# Patient Record
Sex: FEMALE | Race: WHITE | NOT HISPANIC OR LATINO
[De-identification: names, ages, dates, MRNs, and addresses within clinical notes are randomized per-mention and may not be internally consistent; named-entity substitution may affect disease eponyms.]

---

## 2017-02-02 ENCOUNTER — APPOINTMENT (OUTPATIENT)
Dept: ORTHOPEDIC SURGERY | Facility: CLINIC | Age: 77
End: 2017-02-02

## 2017-02-02 VITALS
DIASTOLIC BLOOD PRESSURE: 70 MMHG | WEIGHT: 138 LBS | BODY MASS INDEX: 28.2 KG/M2 | HEIGHT: 58.66 IN | SYSTOLIC BLOOD PRESSURE: 124 MMHG

## 2017-11-21 ENCOUNTER — APPOINTMENT (OUTPATIENT)
Dept: ORTHOPEDIC SURGERY | Facility: CLINIC | Age: 77
End: 2017-11-21
Payer: MEDICARE

## 2017-11-21 PROCEDURE — 99215 OFFICE O/P EST HI 40 MIN: CPT

## 2018-08-16 ENCOUNTER — APPOINTMENT (OUTPATIENT)
Dept: ORTHOPEDIC SURGERY | Facility: CLINIC | Age: 78
End: 2018-08-16
Payer: MEDICARE

## 2018-08-16 VITALS
DIASTOLIC BLOOD PRESSURE: 60 MMHG | OXYGEN SATURATION: 90 % | WEIGHT: 141 LBS | BODY MASS INDEX: 29.2 KG/M2 | HEIGHT: 58.23 IN | HEART RATE: 92 BPM | SYSTOLIC BLOOD PRESSURE: 75 MMHG

## 2018-08-16 PROCEDURE — 99214 OFFICE O/P EST MOD 30 MIN: CPT

## 2019-03-20 ENCOUNTER — APPOINTMENT (OUTPATIENT)
Dept: INTERNAL MEDICINE | Facility: CLINIC | Age: 79
End: 2019-03-20
Payer: MEDICARE

## 2019-03-20 VITALS
WEIGHT: 140 LBS | SYSTOLIC BLOOD PRESSURE: 114 MMHG | TEMPERATURE: 98.7 F | DIASTOLIC BLOOD PRESSURE: 73 MMHG | BODY MASS INDEX: 29.39 KG/M2 | HEIGHT: 58 IN | HEART RATE: 92 BPM | OXYGEN SATURATION: 95 %

## 2019-03-20 PROCEDURE — G0439: CPT

## 2019-03-20 PROCEDURE — 36415 COLL VENOUS BLD VENIPUNCTURE: CPT

## 2019-03-20 NOTE — HISTORY OF PRESENT ILLNESS
[de-identified] : As above doing well;  Recent upper endoscopy: Hiatal Hernia; Gastritis; Esophageal ulcer;  [FreeTextEntry1] : Follow up on multiple medical problems;  Hypertension;  HLD; Osteoporosis;

## 2019-03-20 NOTE — ASSESSMENT
[FreeTextEntry1] : Stable examination; Looks much younger than his stated age;  Will get labs;  All labs refilled

## 2019-03-20 NOTE — PHYSICAL EXAM
[No Acute Distress] : no acute distress [Well Nourished] : well nourished [Well Developed] : well developed [Normal Sclera/Conjunctiva] : normal sclera/conjunctiva [Well-Appearing] : well-appearing [PERRL] : pupils equal round and reactive to light [EOMI] : extraocular movements intact [Normal Outer Ear/Nose] : the outer ears and nose were normal in appearance [Normal Oropharynx] : the oropharynx was normal [Supple] : supple [No JVD] : no jugular venous distention [Thyroid Normal, No Nodules] : the thyroid was normal and there were no nodules present [No Lymphadenopathy] : no lymphadenopathy [No Respiratory Distress] : no respiratory distress  [Clear to Auscultation] : lungs were clear to auscultation bilaterally [No Accessory Muscle Use] : no accessory muscle use [Normal Rate] : normal rate  [Regular Rhythm] : with a regular rhythm [Normal S1, S2] : normal S1 and S2 [No Murmur] : no murmur heard [No Carotid Bruits] : no carotid bruits [No Abdominal Bruit] : a ~M bruit was not heard ~T in the abdomen [No Varicosities] : no varicosities [Pedal Pulses Present] : the pedal pulses are present [No Extremity Clubbing/Cyanosis] : no extremity clubbing/cyanosis [No Edema] : there was no peripheral edema [Soft] : abdomen soft [No Palpable Aorta] : no palpable aorta [Non Tender] : non-tender [Non-distended] : non-distended [No Masses] : no abdominal mass palpated [Normal Bowel Sounds] : normal bowel sounds [No HSM] : no HSM [Normal Posterior Cervical Nodes] : no posterior cervical lymphadenopathy [Normal Anterior Cervical Nodes] : no anterior cervical lymphadenopathy [No CVA Tenderness] : no CVA  tenderness [No Spinal Tenderness] : no spinal tenderness [No Joint Swelling] : no joint swelling [Grossly Normal Strength/Tone] : grossly normal strength/tone [Normal Gait] : normal gait [No Rash] : no rash [Coordination Grossly Intact] : coordination grossly intact [No Focal Deficits] : no focal deficits [Normal Affect] : the affect was normal [Deep Tendon Reflexes (DTR)] : deep tendon reflexes were 2+ and symmetric [Normal Insight/Judgement] : insight and judgment were intact

## 2019-03-20 NOTE — HEALTH RISK ASSESSMENT
[No falls in past year] : Patient reported no falls in the past year [0] : 2) Feeling down, depressed, or hopeless: Not at all (0) [Patient reported mammogram was normal] : Patient reported mammogram was normal [Patient reported PAP Smear was normal] : Patient reported PAP Smear was normal [Patient reported bone density results were abnormal] : Patient reported bone density results were abnormal [Patient reported colonoscopy was normal] : Patient reported colonoscopy was normal [HIV test declined] : HIV test declined [Hepatitis C test declined] : Hepatitis C test declined [] : No [MammogramDate] : 2018 [PapSmearDate] : 2018 [BoneDensityComments] : Followed' by endocrine;  [BoneDensityDate] : 2018 [ColonoscopyDate] : 2015

## 2019-04-01 LAB
17OHP SERPL-MCNC: 13 NG/DL
25(OH)D3 SERPL-MCNC: 47.5 NG/ML
ALBUMIN SERPL ELPH-MCNC: 4.1 G/DL
ALP BLD-CCNC: 65 U/L
ALT SERPL-CCNC: 13 U/L
ANION GAP SERPL CALC-SCNC: 13 MMOL/L
APPEARANCE: CLEAR
AST SERPL-CCNC: 16 U/L
BACTERIA: NEGATIVE
BASOPHILS # BLD AUTO: 0.07 K/UL
BASOPHILS NFR BLD AUTO: 0.7 %
BILIRUB SERPL-MCNC: 0.5 MG/DL
BILIRUBIN URINE: NEGATIVE
BLOOD URINE: NEGATIVE
BUN SERPL-MCNC: 23 MG/DL
CALCIUM SERPL-MCNC: 9.4 MG/DL
CHLORIDE SERPL-SCNC: 102 MMOL/L
CHOLEST SERPL-MCNC: 186 MG/DL
CHOLEST/HDLC SERPL: 2.8 RATIO
CO2 SERPL-SCNC: 24 MMOL/L
COLOR: YELLOW
CREAT SERPL-MCNC: 0.83 MG/DL
EOSINOPHIL # BLD AUTO: 0.31 K/UL
EOSINOPHIL NFR BLD AUTO: 3.2 %
GLUCOSE QUALITATIVE U: NEGATIVE
GLUCOSE SERPL-MCNC: 111 MG/DL
HBA1C MFR BLD HPLC: 6.4 %
HCT VFR BLD CALC: 38.3 %
HDLC SERPL-MCNC: 66 MG/DL
HGB BLD-MCNC: 12.1 G/DL
HYALINE CASTS: 1 /LPF
IMM GRANULOCYTES NFR BLD AUTO: 0.2 %
KETONES URINE: NEGATIVE
LDLC SERPL CALC-MCNC: 99 MG/DL
LEUKOCYTE ESTERASE URINE: NEGATIVE
LYMPHOCYTES # BLD AUTO: 1.64 K/UL
LYMPHOCYTES NFR BLD AUTO: 16.7 %
MAN DIFF?: NORMAL
MCHC RBC-ENTMCNC: 30.7 PG
MCHC RBC-ENTMCNC: 31.6 GM/DL
MCV RBC AUTO: 97.2 FL
MICROSCOPIC-UA: NORMAL
MONOCYTES # BLD AUTO: 0.82 K/UL
MONOCYTES NFR BLD AUTO: 8.3 %
NEUTROPHILS # BLD AUTO: 6.98 K/UL
NEUTROPHILS NFR BLD AUTO: 70.9 %
NITRITE URINE: NEGATIVE
PH URINE: 6.5
PLATELET # BLD AUTO: 265 K/UL
POTASSIUM SERPL-SCNC: 4.5 MMOL/L
PROT SERPL-MCNC: 6.6 G/DL
PROTEIN URINE: NORMAL
RBC # BLD: 3.94 M/UL
RBC # FLD: 13.2 %
RED BLOOD CELLS URINE: 4 /HPF
SODIUM SERPL-SCNC: 139 MMOL/L
SPECIFIC GRAVITY URINE: 1.02
SQUAMOUS EPITHELIAL CELLS: 1 /HPF
T4 FREE SERPL-MCNC: 1.6 NG/DL
TRIGL SERPL-MCNC: 106 MG/DL
TSH SERPL-ACNC: 4.08 UIU/ML
UROBILINOGEN URINE: NORMAL
WBC # FLD AUTO: 9.84 K/UL
WHITE BLOOD CELLS URINE: 1 /HPF

## 2019-06-26 ENCOUNTER — APPOINTMENT (OUTPATIENT)
Dept: INTERNAL MEDICINE | Facility: CLINIC | Age: 79
End: 2019-06-26

## 2019-07-16 ENCOUNTER — RX RENEWAL (OUTPATIENT)
Age: 79
End: 2019-07-16

## 2019-08-07 ENCOUNTER — APPOINTMENT (OUTPATIENT)
Dept: ORTHOPEDIC SURGERY | Facility: CLINIC | Age: 79
End: 2019-08-07
Payer: MEDICARE

## 2019-08-07 VITALS
HEIGHT: 58.27 IN | BODY MASS INDEX: 28.58 KG/M2 | DIASTOLIC BLOOD PRESSURE: 60 MMHG | OXYGEN SATURATION: 98 % | SYSTOLIC BLOOD PRESSURE: 100 MMHG | HEART RATE: 92 BPM | WEIGHT: 138 LBS

## 2019-08-07 PROCEDURE — 99214 OFFICE O/P EST MOD 30 MIN: CPT

## 2019-08-07 NOTE — CONSULT LETTER
[Dear  ___] : Dear  [unfilled], [Courtesy Letter:] : I had the pleasure of seeing your patient, [unfilled], in my office today. [Please see my note below.] : Please see my note below. [Sincerely,] : Sincerely, [FreeTextEntry2] : Dr. John Rice\par [address]\[place] [address]

## 2019-08-07 NOTE — HISTORY OF PRESENT ILLNESS
[FreeTextEntry1] : This is a 1 year f/u visit for this 78 year old woman who was seen by me in 2017, prior 2017 and 4.5 years prior to that.\par Key medical history:\par 1. this patient, still a , underwent menopause at age 51, 25 years ago\par 2. pt took HRT for 2 years after menopause, completing this in \par 3. there is no personal hx of fracture: there is no hx of parental hip fx\par 4. max height is 5'1", current height 4'10.5", 2-2.5" height loss.\par \par OP Rx Hx:\par [id number]                HRT                    2 years\par [id number]                raloxifene          17 years\par -3/12                 IV ibandronate   1.5 years\par 3/12-3/14                  holiday                2 years\par 3/14-                  IV ibandronate   2.5 years\par -present            no medication       3 years\par \par 5. of note, pt has had GERD and this is why she never took oral bisphosphonates\par \par DXA done 18 was rev and compared with: DXA done 16 and  2012:\par T-scores: -0.7, -2.4, -2.0, -2.4 at the LS, L FN, L TH and R 1/3 radius; no significant change since prior studies\par T-scores: -0.6, -2.5, -1.9 and -2.5 at the LS (lowest T-score is -0.9 at L1), L FN, L TH and L 1/3 radius compared with:\par T-scores: -0.1, -2.3, -1.9 and -2.3 at these sites in \par \par Today, updated labs done 19 were rev and compared with: 18 ,  17 ( baseline markers drawn in 2017) and prior labs 16:\par glucose 117, A1c 6.3, crea: 0.88, BSAP 9.4 and  PTH/ca 45/9.4, 25 D: 48\par 25 D: 47, 50, 50\par TSH: not updated, 3.6\par PTH/ca: 30/9.6  alb: 4.0; 47/9.0  alb: 4.1\par calcium; 9.6, 9.0,  9.6  albumin : 4.5\par BSAP: 7.0, 7.0, 6.0\par CTX: 265, 197, 151\par glucose: 114, 105\par A1c: 6.2,  6.1, 6.4\par creat: 0.95, 0.92, 1.04\par hb/hct: 12.6/36.8\par \par chol: 214, 206, 210, 193\par HDL: 62, 76, 68,  79\par LDL: 125, 105, 119,  94\par T, 115, 102\par chol/HDL: 2.7\par \par PMH\par GERD\par diverticulitis\par \par FH\par mother, alive 103, has had breast cancer\par father,  79, DM\par \par SH\par , works in real estate, 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

## 2019-08-07 NOTE — ASSESSMENT
[FreeTextEntry1] : \par 78 year old woman with osteoporosis by DXA at femoral neck, 2.5 inches of height loss, no personal fractures or history of parental hip fractures, who has taken a cumulative 4 years of IV bisphosphonate (boniva), last dose July 2016. Bone density has been stable over 6 years of monitoring. With updated bone markers rising and 3 years since prior Rx we will resume anti resorptive now, using SQ Prolia because of history of GERD and requirement for daily Nexium for persistent symptoms.\par There continues to be IGT with A1c of 6.3.\par \par Plan:\par 1. SQ Prolia ordered, side effects reviewed\par 2. labs in 6 months, then f/u\par 3. with regard to lipids, referral made to internist for f/u\par \par  [Denosumab Therapy] : Risks  and benefits of denosumab therapy were discussed with the patient including eczema, cellulitis, osteonecrosis of the jaw and atypical femur fractures

## 2019-08-14 ENCOUNTER — OUTPATIENT (OUTPATIENT)
Dept: OUTPATIENT SERVICES | Facility: HOSPITAL | Age: 79
LOS: 1 days | End: 2019-08-14
Payer: MEDICARE

## 2019-08-14 ENCOUNTER — APPOINTMENT (OUTPATIENT)
Age: 79
End: 2019-08-14

## 2019-08-14 VITALS
DIASTOLIC BLOOD PRESSURE: 60 MMHG | RESPIRATION RATE: 18 BRPM | TEMPERATURE: 98 F | SYSTOLIC BLOOD PRESSURE: 105 MMHG | HEART RATE: 77 BPM | OXYGEN SATURATION: 97 %

## 2019-08-14 DIAGNOSIS — M81.0 AGE-RELATED OSTEOPOROSIS WITHOUT CURRENT PATHOLOGICAL FRACTURE: ICD-10-CM

## 2019-08-14 PROCEDURE — 96372 THER/PROPH/DIAG INJ SC/IM: CPT

## 2019-08-14 RX ORDER — DENOSUMAB 60 MG/ML
60 INJECTION SUBCUTANEOUS ONCE
Refills: 0 | Status: COMPLETED | OUTPATIENT
Start: 2019-08-14 | End: 2019-08-14

## 2019-08-14 RX ADMIN — DENOSUMAB 60 MILLIGRAM(S): 60 INJECTION SUBCUTANEOUS at 15:36

## 2019-09-18 ENCOUNTER — EMERGENCY (EMERGENCY)
Facility: HOSPITAL | Age: 79
LOS: 1 days | Discharge: HOME | End: 2019-09-18
Attending: EMERGENCY MEDICINE | Admitting: EMERGENCY MEDICINE
Payer: MEDICARE

## 2019-09-18 VITALS
RESPIRATION RATE: 18 BRPM | DIASTOLIC BLOOD PRESSURE: 82 MMHG | SYSTOLIC BLOOD PRESSURE: 109 MMHG | OXYGEN SATURATION: 99 % | HEART RATE: 83 BPM | TEMPERATURE: 98 F

## 2019-09-18 VITALS
HEART RATE: 84 BPM | OXYGEN SATURATION: 98 % | SYSTOLIC BLOOD PRESSURE: 108 MMHG | RESPIRATION RATE: 18 BRPM | TEMPERATURE: 98 F | HEIGHT: 59 IN | WEIGHT: 139.33 LBS | DIASTOLIC BLOOD PRESSURE: 74 MMHG
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
## 2019-09-18 DIAGNOSIS — S01.111A LACERATION WITHOUT FOREIGN BODY OF RIGHT EYELID AND PERIOCULAR AREA, INITIAL ENCOUNTER: ICD-10-CM
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
## 2019-09-18 DIAGNOSIS — S02.2XXA FRACTURE OF NASAL BONES, INITIAL ENCOUNTER FOR CLOSED FRACTURE: ICD-10-CM

## 2019-09-18 DIAGNOSIS — S01.21XA LACERATION WITHOUT FOREIGN BODY OF NOSE, INITIAL ENCOUNTER: ICD-10-CM
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
## 2019-09-18 PROCEDURE — 70486 CT MAXILLOFACIAL W/O DYE: CPT

## 2019-09-18 PROCEDURE — 70486 CT MAXILLOFACIAL W/O DYE: CPT | Mod: 26

## 2019-09-18 PROCEDURE — 70450 CT HEAD/BRAIN W/O DYE: CPT

## 2019-09-18 PROCEDURE — 99285 EMERGENCY DEPT VISIT HI MDM: CPT | Mod: 25

## 2019-09-18 PROCEDURE — 13151 CMPLX RPR E/N/E/L 1.1-2.5 CM: CPT

## 2019-09-18 PROCEDURE — 99284 EMERGENCY DEPT VISIT MOD MDM: CPT

## 2019-09-18 PROCEDURE — 12011 RPR F/E/E/N/L/M 2.5 CM/<: CPT | Mod: XU

## 2019-09-18 PROCEDURE — 21310: CPT

## 2019-09-18 PROCEDURE — 70450 CT HEAD/BRAIN W/O DYE: CPT | Mod: 26

## 2019-09-18 RX ORDER — ACETAMINOPHEN 500 MG
650 TABLET ORAL ONCE
Refills: 0 | Status: COMPLETED | OUTPATIENT
Start: 2019-09-18 | End: 2019-09-18

## 2019-09-18 RX ADMIN — Medication 650 MILLIGRAM(S): at 10:47

## 2019-09-18 NOTE — ED ADULT NURSE NOTE - OBJECTIVE STATEMENT
Patient presents to the ED after rolling off her bed while sleeping today.  Denies LOC.   Patient c/o pain to the right eye, nose.  AA&OX3, respirations unlabored, non-diaphoretic, no pallor.  Patient has 8cm laceration to right side of nose.  Minimal bleeding.  Ecchymosis to entire orbit.

## 2019-09-18 NOTE — ED PROVIDER NOTE - PATIENT PORTAL LINK FT
You can access the FollowMyHealth Patient Portal offered by [facility] by registering at the following website: [email or web address]. By joining [facility]’s FollowMyHealth portal, you will also be able to view your health information using other applications (apps) compatible with our system.

## 2019-09-18 NOTE — ED PROVIDER NOTE - SKIN, MLM
ecchymosis b/l under eyes. 2cm vertical laceration at right medial eyebrow, no active bleeding. ecchymosis b/l under eyes + nasal bridge w mild ttp. 2cm vertical laceration at right medial eyebrow, no active bleeding.

## 2019-09-18 NOTE — ED ADULT NURSE NOTE - NSIMPLEMENTINTERV_GEN_ALL_ED
Implemented All Fall Risk Interventions:  [place] to call system. Call bell, personal items and telephone within reach. Instruct patient to call for assistance. Room bathroom lighting operational. Non-slip footwear when patient is off stretcher. Physically safe environment: no spills, clutter or unnecessary equipment. Stretcher in lowest position, wheels locked, appropriate side rails in place. Provide visual cue, wrist band, yellow gown, etc. Monitor gait and stability. Monitor for mental status changes and reorient to person, place, and time. Review medications for side effects contributing to fall risk. Reinforce activity limits and safety measures with patient and family.

## 2019-09-18 NOTE — ED PROVIDER NOTE - OBJECTIVE STATEMENT
79y female with laceration over right eyebrow w surrounding ecchymosis s/p hitting head on nightstand this AM. No LOC. No other injuries. No ACs / anti-platelets.

## 2019-09-18 NOTE — ED PROVIDER NOTE - CARE PROVIDER_API CALL
Golden Klein)  Plastic Surgery  [address] [place] [id number]  Phone: [phone]  Fax: [phone]  Follow Up Time: 4-6 Days

## 2019-09-18 NOTE — ED PROVIDER NOTE - PROGRESS NOTE DETAILS
Repair by plastics complete. CT head + max/face reviewed + printed for pt. Will f/up plastics Dr Klein next week. Repair by plastics complete. CT head + max/face reviewed + printed for pt + d/w pt. Will f/up plastics Dr Klein next week.

## 2019-09-18 NOTE — ED PROVIDER NOTE - NSFOLLOWUPINSTRUCTIONS_ED_ALL_ED_FT
Follow up with Dr Avalos (plastic surgery) on Tuesday, September 24th. Remove the bandage in 2 days, wash and apply bacitracin, then re-bandage. Please bring the results of your CTs to the appointment as well, as there are 2 nasal bone fractures.    Tylenol or Motrin for pain as needed.

## 2019-09-18 NOTE — ED ADULT TRIAGE NOTE - CHIEF COMPLAINT QUOTE
post mechanical fall, reported falling out of bed this morning and hitting her head on a brass corner. Denies loc or on any anticoagulants. With laceration to brow and ecchymosis to periorbital, and hematoma to frontal.

## 2019-09-18 NOTE — ED PROVIDER NOTE - CLINICAL SUMMARY MEDICAL DECISION MAKING FREE TEXT BOX
79y female with facial laceration after low mechanism injury this AM. HDS, no neuro defects. Pt requests plastics for repair; paged out. Pending CT head + max/face. 79y female with facial laceration after low mechanism injury this AM. HDS, no neuro defects. Pt requests plastics for repair; paged out d/w Dr Klein. Pending CT head + max/face.

## 2019-09-18 NOTE — ED PROVIDER NOTE - CARE PLAN
Principal Discharge DX:	Facial laceration, initial encounter Principal Discharge DX:	Facial laceration, initial encounter  Secondary Diagnosis:	Nasal bone fractures

## 2019-10-15 PROBLEM — K21.9 GASTRO-ESOPHAGEAL REFLUX DISEASE WITHOUT ESOPHAGITIS: Chronic | Status: ACTIVE | Noted: 2019-09-18

## 2019-10-15 PROBLEM — E78.5 HYPERLIPIDEMIA, UNSPECIFIED: Chronic | Status: ACTIVE | Noted: 2019-09-18

## 2019-10-15 PROBLEM — I10 ESSENTIAL (PRIMARY) HYPERTENSION: Chronic | Status: ACTIVE | Noted: 2019-09-18

## 2019-11-14 ENCOUNTER — APPOINTMENT (OUTPATIENT)
Dept: INTERNAL MEDICINE | Facility: CLINIC | Age: 79
End: 2019-11-14
Payer: MEDICARE

## 2019-11-14 VITALS
WEIGHT: 136 LBS | DIASTOLIC BLOOD PRESSURE: 60 MMHG | SYSTOLIC BLOOD PRESSURE: 96 MMHG | TEMPERATURE: 97.8 F | HEART RATE: 89 BPM | BODY MASS INDEX: 28.16 KG/M2 | OXYGEN SATURATION: 97 % | HEIGHT: 58.27 IN

## 2019-11-14 PROCEDURE — 36415 COLL VENOUS BLD VENIPUNCTURE: CPT

## 2019-11-14 PROCEDURE — 99213 OFFICE O/P EST LOW 20 MIN: CPT | Mod: 25

## 2019-11-14 NOTE — HISTORY OF PRESENT ILLNESS
[FreeTextEntry1] : Concerned about  increased Lipids; Also increase Hemoglobin  A1C [de-identified] : As above medications without  change; Lipitor dose without change and marked difference in Lipid panel between both labs; Using Prolia \par for  bone loss\par Otherwise some minor problems;  \par Some exercise

## 2019-11-14 NOTE — PHYSICAL EXAM
[No Acute Distress] : no acute distress [Well Nourished] : well nourished [Well Developed] : well developed [Well-Appearing] : well-appearing [PERRL] : pupils equal round and reactive to light [Normal Sclera/Conjunctiva] : normal sclera/conjunctiva [EOMI] : extraocular movements intact [Normal Oropharynx] : the oropharynx was normal [Normal Outer Ear/Nose] : the outer ears and nose were normal in appearance [No JVD] : no jugular venous distention [No Lymphadenopathy] : no lymphadenopathy [Supple] : supple [Thyroid Normal, No Nodules] : the thyroid was normal and there were no nodules present [No Respiratory Distress] : no respiratory distress  [No Accessory Muscle Use] : no accessory muscle use [Normal Rate] : normal rate  [Clear to Auscultation] : lungs were clear to auscultation bilaterally [Normal S1, S2] : normal S1 and S2 [Regular Rhythm] : with a regular rhythm [No Carotid Bruits] : no carotid bruits [No Murmur] : no murmur heard [No Abdominal Bruit] : a ~M bruit was not heard ~T in the abdomen [No Varicosities] : no varicosities [Pedal Pulses Present] : the pedal pulses are present [No Edema] : there was no peripheral edema [No Palpable Aorta] : no palpable aorta [No Extremity Clubbing/Cyanosis] : no extremity clubbing/cyanosis [Soft] : abdomen soft [Non Tender] : non-tender [No Masses] : no abdominal mass palpated [Non-distended] : non-distended [Normal Bowel Sounds] : normal bowel sounds [No HSM] : no HSM [Normal Posterior Cervical Nodes] : no posterior cervical lymphadenopathy [Normal Anterior Cervical Nodes] : no anterior cervical lymphadenopathy [No CVA Tenderness] : no CVA  tenderness [No Spinal Tenderness] : no spinal tenderness [No Joint Swelling] : no joint swelling [Grossly Normal Strength/Tone] : grossly normal strength/tone [No Rash] : no rash [Coordination Grossly Intact] : coordination grossly intact [No Focal Deficits] : no focal deficits [Normal Gait] : normal gait [Deep Tendon Reflexes (DTR)] : deep tendon reflexes were 2+ and symmetric [Normal Affect] : the affect was normal [Normal Insight/Judgement] : insight and judgment were intact

## 2019-11-21 LAB
CHOLEST SERPL-MCNC: 187 MG/DL
CHOLEST/HDLC SERPL: 2.6 RATIO
ESTIMATED AVERAGE GLUCOSE: 131 MG/DL
HBA1C MFR BLD HPLC: 6.2 %
HDLC SERPL-MCNC: 71 MG/DL
LDLC SERPL CALC-MCNC: 91 MG/DL
TRIGL SERPL-MCNC: 127 MG/DL

## 2020-01-24 DIAGNOSIS — Z87.09 PERSONAL HISTORY OF OTHER DISEASES OF THE RESPIRATORY SYSTEM: ICD-10-CM

## 2020-01-28 ENCOUNTER — APPOINTMENT (OUTPATIENT)
Dept: RHEUMATOLOGY | Facility: CLINIC | Age: 80
End: 2020-01-28
Payer: MEDICARE

## 2020-01-28 VITALS
HEART RATE: 85 BPM | BODY MASS INDEX: 28.34 KG/M2 | WEIGHT: 135 LBS | OXYGEN SATURATION: 97 % | HEIGHT: 58 IN | SYSTOLIC BLOOD PRESSURE: 111 MMHG | TEMPERATURE: 98.7 F | DIASTOLIC BLOOD PRESSURE: 73 MMHG

## 2020-01-28 PROCEDURE — 99205 OFFICE O/P NEW HI 60 MIN: CPT | Mod: GC

## 2020-01-30 NOTE — PHYSICAL EXAM
[General Appearance - Alert] : alert [General Appearance - In No Acute Distress] : in no acute distress [Sclera] : the sclera and conjunctiva were normal [PERRL With Normal Accommodation] : pupils were equal in size, round, and reactive to light [Extraocular Movements] : extraocular movements were intact [Outer Ear] : the ears and nose were normal in appearance [Oropharynx] : the oropharynx was normal [Neck Appearance] : the appearance of the neck was normal [Neck Cervical Mass (___cm)] : no neck mass was observed [Jugular Venous Distention Increased] : there was no jugular-venous distention [Thyroid Diffuse Enlargement] : the thyroid was not enlarged [Thyroid Nodule] : there were no palpable thyroid nodules [Auscultation Breath Sounds / Voice Sounds] : lungs were clear to auscultation bilaterally [Heart Rate And Rhythm] : heart rate was normal and rhythm regular [Heart Sounds] : normal S1 and S2 [Heart Sounds Gallop] : no gallops [Murmurs] : no murmurs [Heart Sounds Pericardial Friction Rub] : no pericardial rub [Full Pulse] : the pedal pulses are present [Edema] : there was no peripheral edema [Bowel Sounds] : normal bowel sounds [Abdomen Soft] : soft [Abdomen Tenderness] : non-tender [Abdomen Mass (___ Cm)] : no abdominal mass palpated [No CVA Tenderness] : no ~M costovertebral angle tenderness [No Spinal Tenderness] : no spinal tenderness [Abnormal Walk] : normal gait [Motor Tone] : muscle strength and tone were normal [Skin Color & Pigmentation] : normal skin color and pigmentation [Musculoskeletal - Swelling] : no joint swelling seen [Deep Tendon Reflexes (DTR)] : deep tendon reflexes were 2+ and symmetric [] : no rash [Skin Turgor] : normal skin turgor [Sensation] : the sensory exam was normal to light touch and pinprick [No Focal Deficits] : no focal deficits [FreeTextEntry1] : had small heberden nodule on left 5th DIP

## 2020-01-30 NOTE — ASSESSMENT
[FreeTextEntry1] : Patient seen and examined with Dr. Kaplan\par Agree with history, physical exam, assessment and plan as describe above:\par 79 year old female presents for evaluation of osteoporosis. She has a history of hammertoes in the L 1/2 MTPs. \par Patient was diagnosed by DEXA in 2016 and has no history of fractures. Most recently her BMD was noted to improved in 2018, within osteopenic range. She has no history of fractures. No history of dental procedures or prosthesis. She does mild amounts of cardiovascular exercise but no weight bearing exercises, but is interested in PT which we will refer her to today. She does calcium and vitamin D, and we discussed the importance of dietary calcium and will add supplemental D. Has received one dose of Prolia in the past in August 2019. Today we discussed the importance of medication adherance on Prolia given the risk of vertebral fractures after cessation of the medication or from delayed administration. Will check bone turnover markers to ensure she is responding to Prolia and schedule for mid February 2020. \par

## 2020-01-30 NOTE — HISTORY OF PRESENT ILLNESS
[Arthralgias] : arthralgias [Joint Deformity] : joint deformity [FreeTextEntry1] : Patient is a 79 year old female with history of HTN, HLD, GERD, hammer toe on the left first and second proximal interphalangeal joint, and osteoporosis on denosumab infusion every 6 months who presents to [facility] care. Patient had a DEXA scan which showed osteoporosis in 2016. She followed up with Dr. Ferrer endocrinology and initiated denosunab with first dose given Aug 14, 2019, next due Feb 14, 2020. Most recent DEXA scan in 2018 shows osteopenia. She states overall good health. Has occasional low back pain and upper back pain. Unable to tolerate oral bisphosponates due to acid reflux. Denies swelling of joints, fevers, chills, nausea, vomiting, and diarrhea.  [Anorexia] : no anorexia [Weight Loss] : no weight loss [Malaise] : no malaise [Fever] : no fever [Chills] : no chills [Fatigue] : no fatigue [Depression] : no depression [Malar Facial Rash] : no malar facial rash [Skin Lesions] : no lesions [Skin Nodules] : no skin nodules [Oral Ulcers] : no oral ulcers [Cough] : no cough [Dry Mouth] : no dry mouth [Dysphagia] : no dysphagia [Dysphonia] : no dysphonia [Shortness of Breath] : no shortness of breath [Chest Pain] : no chest pain [Joint Swelling] : no joint swelling [Joint Warmth] : no joint warmth [Decreased ROM] : no decreased range of motion [Falls] : no falls [Morning Stiffness] : no morning stiffness [Difficulty Standing] : no difficulty standing [Difficulty Walking] : no difficulty walking [Dyspnea] : no dyspnea [Myalgias] : no myalgias [Muscle Weakness] : no muscle weakness [Muscle Spasms] : no muscle spasms [Muscle Cramping] : no muscle cramping [Visual Changes] : no visual changes [Eye Redness] : no eye redness [Eye Pain] : no eye pain [Dry Eyes] : no dry eyes [de-identified] : mild tenderness on left first and second proximal tarsal joints. mild tenderness low back and upper back. mild Heberden nodule left 5th DIP

## 2020-02-03 LAB
25(OH)D3 SERPL-MCNC: 74 NG/ML
ALBUMIN SERPL ELPH-MCNC: 4.3 G/DL
ALP BLD-CCNC: 70 U/L
ALP BONE SERPL-MCNC: 6.5 MCG/L
ALT SERPL-CCNC: 21 U/L
ANION GAP SERPL CALC-SCNC: 15 MMOL/L
AST SERPL-CCNC: 30 U/L
BASOPHILS # BLD AUTO: 0.03 K/UL
BASOPHILS NFR BLD AUTO: 0.8 %
BILIRUB SERPL-MCNC: 0.6 MG/DL
BUN SERPL-MCNC: 20 MG/DL
CA-I SERPL-SCNC: 1.22 MMOL/L
CALCIUM SERPL-MCNC: 9.1 MG/DL
CHLORIDE SERPL-SCNC: 104 MMOL/L
CO2 SERPL-SCNC: 23 MMOL/L
COLLAGEN CTX SERPL-MCNC: 91 PG/ML
COLLAGEN NTX UR-SCNC: 16
CREAT SERPL-MCNC: 0.8 MG/DL
CREAT UR-MCNC: 216 MG/DL
EOSINOPHIL # BLD AUTO: 0.2 K/UL
EOSINOPHIL NFR BLD AUTO: 5.5 %
ESTIMATED AVERAGE GLUCOSE: 140 MG/DL
GLUCOSE SERPL-MCNC: 107 MG/DL
HBA1C MFR BLD HPLC: 6.5 %
HCT VFR BLD CALC: 39.7 %
HGB BLD-MCNC: 12.7 G/DL
IMM GRANULOCYTES NFR BLD AUTO: 0 %
LYMPHOCYTES # BLD AUTO: 1.25 K/UL
LYMPHOCYTES NFR BLD AUTO: 34.2 %
MAGNESIUM SERPL-MCNC: 1.8 MG/DL
MAN DIFF?: NORMAL
MCHC RBC-ENTMCNC: 30.3 PG
MCHC RBC-ENTMCNC: 32 GM/DL
MCV RBC AUTO: 94.7 FL
MONOCYTES # BLD AUTO: 0.43 K/UL
MONOCYTES NFR BLD AUTO: 11.7 %
NEUTROPHILS # BLD AUTO: 1.75 K/UL
NEUTROPHILS NFR BLD AUTO: 47.8 %
OSTEOCALCIN SERPL-MCNC: <5 NG/ML
PHOSPHATE SERPL-MCNC: 2.8 MG/DL
PLATELET # BLD AUTO: 207 K/UL
POTASSIUM SERPL-SCNC: 4.3 MMOL/L
PROT SERPL-MCNC: 6.5 G/DL
RBC # BLD: 4.19 M/UL
RBC # FLD: 13.2 %
SODIUM SERPL-SCNC: 141 MMOL/L
WBC # FLD AUTO: 3.66 K/UL

## 2020-02-10 ENCOUNTER — OUTPATIENT (OUTPATIENT)
Dept: OUTPATIENT SERVICES | Facility: HOSPITAL | Age: 80
LOS: 1 days | End: 2020-02-10
Payer: MEDICARE

## 2020-02-10 ENCOUNTER — APPOINTMENT (OUTPATIENT)
Age: 80
End: 2020-02-10

## 2020-02-10 VITALS
RESPIRATION RATE: 18 BRPM | DIASTOLIC BLOOD PRESSURE: 59 MMHG | TEMPERATURE: 98 F | SYSTOLIC BLOOD PRESSURE: 90 MMHG | OXYGEN SATURATION: 98 % | HEART RATE: 95 BPM

## 2020-02-10 DIAGNOSIS — M81.0 AGE-RELATED OSTEOPOROSIS WITHOUT CURRENT PATHOLOGICAL FRACTURE: ICD-10-CM

## 2020-02-10 RX ORDER — DENOSUMAB 60 MG/ML
60 INJECTION SUBCUTANEOUS ONCE
Refills: 0 | Status: COMPLETED | OUTPATIENT
Start: 2020-02-10 | End: 2020-02-10

## 2020-02-10 RX ADMIN — DENOSUMAB 60 MILLIGRAM(S): 60 INJECTION SUBCUTANEOUS at 13:12

## 2020-02-14 ENCOUNTER — APPOINTMENT (OUTPATIENT)
Dept: INTERNAL MEDICINE | Facility: CLINIC | Age: 80
End: 2020-02-14
Payer: MEDICARE

## 2020-02-14 VITALS
OXYGEN SATURATION: 95 % | HEIGHT: 58 IN | DIASTOLIC BLOOD PRESSURE: 65 MMHG | HEART RATE: 103 BPM | WEIGHT: 135 LBS | TEMPERATURE: 97.8 F | SYSTOLIC BLOOD PRESSURE: 102 MMHG | BODY MASS INDEX: 28.34 KG/M2

## 2020-02-14 PROCEDURE — 99213 OFFICE O/P EST LOW 20 MIN: CPT

## 2020-02-14 RX ORDER — UBIDECARENONE/VIT E ACET 100MG-5
25 MCG CAPSULE ORAL
Qty: 90 | Refills: 0 | Status: ACTIVE | COMMUNITY
Start: 2020-01-28

## 2020-02-14 NOTE — HISTORY OF PRESENT ILLNESS
[FreeTextEntry1] : Recent URI and persistent cough [de-identified] : As above and cough is productive;  No fever;  Does get URI each winter;

## 2020-02-14 NOTE — HEALTH RISK ASSESSMENT
[No] : No [No falls in past year] : Patient reported no falls in the past year [0] : 2) Feeling down, depressed, or hopeless: Not at all (0) [] : No [[id number]] : 0

## 2020-02-14 NOTE — ASSESSMENT
[FreeTextEntry1] : Persistent  cough ; Appears to have bronchitis;  Will give Z Yoshi; Also cough medication

## 2020-02-27 PROCEDURE — 96372 THER/PROPH/DIAG INJ SC/IM: CPT

## 2020-03-23 ENCOUNTER — APPOINTMENT (OUTPATIENT)
Dept: INTERNAL MEDICINE | Facility: CLINIC | Age: 80
End: 2020-03-23

## 2020-05-28 ENCOUNTER — APPOINTMENT (OUTPATIENT)
Dept: INTERNAL MEDICINE | Facility: CLINIC | Age: 80
End: 2020-05-28
Payer: MEDICARE

## 2020-05-28 VITALS
DIASTOLIC BLOOD PRESSURE: 62 MMHG | TEMPERATURE: 98.5 F | BODY MASS INDEX: 28.34 KG/M2 | SYSTOLIC BLOOD PRESSURE: 94 MMHG | OXYGEN SATURATION: 96 % | WEIGHT: 135 LBS | HEART RATE: 97 BPM | HEIGHT: 58 IN

## 2020-05-28 DIAGNOSIS — J06.9 ACUTE UPPER RESPIRATORY INFECTION, UNSPECIFIED: ICD-10-CM

## 2020-05-28 DIAGNOSIS — R05 COUGH: ICD-10-CM

## 2020-05-28 PROCEDURE — 99213 OFFICE O/P EST LOW 20 MIN: CPT

## 2020-05-28 NOTE — HISTORY OF PRESENT ILLNESS
[FreeTextEntry1] : Dry cough and hoarseness;  [de-identified] : Otherwise with complaint; No fever

## 2020-05-28 NOTE — HEALTH RISK ASSESSMENT
[No] : No [No falls in past year] : Patient reported no falls in the past year [0] : 2) Feeling down, depressed, or hopeless: Not at all (0) [] : No [[id number]] : 0

## 2020-05-28 NOTE — PHYSICAL EXAM
[No Respiratory Distress] : no respiratory distress  [No Accessory Muscle Use] : no accessory muscle use [Clear to Auscultation] : lungs were clear to auscultation bilaterally [de-identified] : throat injected

## 2020-06-02 LAB
BASOPHILS # BLD AUTO: 0.05 K/UL
BASOPHILS NFR BLD AUTO: 0.8 %
EOSINOPHIL # BLD AUTO: 0.24 K/UL
EOSINOPHIL NFR BLD AUTO: 4 %
HCT VFR BLD CALC: 40.6 %
HGB BLD-MCNC: 12.9 G/DL
IMM GRANULOCYTES NFR BLD AUTO: 0.2 %
LYMPHOCYTES # BLD AUTO: 1.81 K/UL
LYMPHOCYTES NFR BLD AUTO: 30.3 %
MAN DIFF?: NORMAL
MCHC RBC-ENTMCNC: 30.9 PG
MCHC RBC-ENTMCNC: 31.8 GM/DL
MCV RBC AUTO: 97.1 FL
MONOCYTES # BLD AUTO: 0.64 K/UL
MONOCYTES NFR BLD AUTO: 10.7 %
NEUTROPHILS # BLD AUTO: 3.22 K/UL
NEUTROPHILS NFR BLD AUTO: 54 %
PLATELET # BLD AUTO: 226 K/UL
RBC # BLD: 4.18 M/UL
RBC # FLD: 13.1 %
SARS-COV-2 IGG SERPL IA-ACNC: <0.1 INDEX
SARS-COV-2 IGG SERPL QL IA: NEGATIVE
WBC # FLD AUTO: 5.97 K/UL

## 2020-06-05 ENCOUNTER — RX RENEWAL (OUTPATIENT)
Age: 80
End: 2020-06-05

## 2020-07-21 RX ORDER — DENOSUMAB 60 MG/ML
60 INJECTION SUBCUTANEOUS
Qty: 1 | Refills: 4 | Status: ACTIVE | COMMUNITY
Start: 2020-07-21 | End: 1900-01-01

## 2020-08-10 ENCOUNTER — APPOINTMENT (OUTPATIENT)
Dept: RHEUMATOLOGY | Facility: CLINIC | Age: 80
End: 2020-08-10
Payer: MEDICARE

## 2020-08-10 VITALS
OXYGEN SATURATION: 95 % | WEIGHT: 135 LBS | BODY MASS INDEX: 28.34 KG/M2 | HEART RATE: 80 BPM | TEMPERATURE: 98.9 F | HEIGHT: 58 IN

## 2020-08-10 PROCEDURE — 99214 OFFICE O/P EST MOD 30 MIN: CPT | Mod: 25

## 2020-08-10 PROCEDURE — 36415 COLL VENOUS BLD VENIPUNCTURE: CPT

## 2020-08-10 NOTE — ASSESSMENT
[FreeTextEntry1] : 79 year old female presents for follow up evaluation of osteoporosis.  \par Continue Prolia, submit forms\par Check bone turnover markers - patient fasting\par DEXA 10/20\par Dietary calcium and Vit D\par Encourage weight bearing exercises

## 2020-08-10 NOTE — HISTORY OF PRESENT ILLNESS
[Arthralgias] : arthralgias [Joint Deformity] : joint deformity [FreeTextEntry1] : Initial Visit;\par Patient was diagnosed by DEXA in 2016 and has no history of fractures. Most recently her BMD was noted to improved in 2018, within osteopenic range. She has no history of fractures. No history of dental procedures or prosthesis. She does mild amounts of cardiovascular exercise but no weight bearing exercises, but is interested in PT which we will refer her to today. She does calcium and vitamin D, and we discussed the importance of dietary calcium and will add supplemental D. Has received one dose of Prolia in the past in August 2019. Today we discussed the importance of medication adherance on Prolia given the risk of vertebral fractures after cessation of the medication or from delayed administration. Will check bone turnover markers to ensure she is responding to Prolia and schedule for mid February 2020.  [Anorexia] : no anorexia [Weight Loss] : no weight loss [Fever] : no fever [Malaise] : no malaise [Chills] : no chills [Fatigue] : no fatigue [Depression] : no depression [Malar Facial Rash] : no malar facial rash [Skin Lesions] : no lesions [Oral Ulcers] : no oral ulcers [Skin Nodules] : no skin nodules [Cough] : no cough [Dry Mouth] : no dry mouth [Dysphonia] : no dysphonia [Dysphagia] : no dysphagia [Shortness of Breath] : no shortness of breath [Chest Pain] : no chest pain [Joint Swelling] : no joint swelling [Joint Warmth] : no joint warmth [Decreased ROM] : no decreased range of motion [Morning Stiffness] : no morning stiffness [Falls] : no falls [Difficulty Standing] : no difficulty standing [Difficulty Walking] : no difficulty walking [Myalgias] : no myalgias [Dyspnea] : no dyspnea [Muscle Weakness] : no muscle weakness [Muscle Spasms] : no muscle spasms [Muscle Cramping] : no muscle cramping [Eye Pain] : no eye pain [Visual Changes] : no visual changes [Eye Redness] : no eye redness [de-identified] : mild tenderness on left first and second proximal tarsal joints. mild tenderness low back and upper back. mild Heberden nodule left 5th DIP [Dry Eyes] : no dry eyes

## 2020-08-10 NOTE — PHYSICAL EXAM
[General Appearance - Alert] : alert [General Appearance - In No Acute Distress] : in no acute distress [Sclera] : the sclera and conjunctiva were normal [PERRL With Normal Accommodation] : pupils were equal in size, round, and reactive to light [Extraocular Movements] : extraocular movements were intact [Outer Ear] : the ears and nose were normal in appearance [Oropharynx] : the oropharynx was normal [Neck Appearance] : the appearance of the neck was normal [Neck Cervical Mass (___cm)] : no neck mass was observed [Thyroid Diffuse Enlargement] : the thyroid was not enlarged [Jugular Venous Distention Increased] : there was no jugular-venous distention [Thyroid Nodule] : there were no palpable thyroid nodules [Auscultation Breath Sounds / Voice Sounds] : lungs were clear to auscultation bilaterally [Heart Rate And Rhythm] : heart rate was normal and rhythm regular [Heart Sounds] : normal S1 and S2 [Heart Sounds Gallop] : no gallops [Murmurs] : no murmurs [Heart Sounds Pericardial Friction Rub] : no pericardial rub [Full Pulse] : the pedal pulses are present [Edema] : there was no peripheral edema [Bowel Sounds] : normal bowel sounds [Abdomen Soft] : soft [Abdomen Tenderness] : non-tender [Abdomen Mass (___ Cm)] : no abdominal mass palpated [No CVA Tenderness] : no ~M costovertebral angle tenderness [No Spinal Tenderness] : no spinal tenderness [Abnormal Walk] : normal gait [Musculoskeletal - Swelling] : no joint swelling seen [Motor Tone] : muscle strength and tone were normal [Skin Color & Pigmentation] : normal skin color and pigmentation [Skin Turgor] : normal skin turgor [] : no rash [Deep Tendon Reflexes (DTR)] : deep tendon reflexes were 2+ and symmetric [Sensation] : the sensory exam was normal to light touch and pinprick [No Focal Deficits] : no focal deficits [FreeTextEntry1] : had small heberden nodule on left 5th DIP [Oriented To Time, Place, And Person] : oriented to person, place, and time

## 2020-08-13 ENCOUNTER — APPOINTMENT (OUTPATIENT)
Age: 80
End: 2020-08-13

## 2020-08-13 ENCOUNTER — OUTPATIENT (OUTPATIENT)
Dept: OUTPATIENT SERVICES | Facility: HOSPITAL | Age: 80
LOS: 1 days | End: 2020-08-13
Payer: MEDICARE

## 2020-08-13 VITALS
HEART RATE: 73 BPM | TEMPERATURE: 97 F | OXYGEN SATURATION: 99 % | DIASTOLIC BLOOD PRESSURE: 77 MMHG | SYSTOLIC BLOOD PRESSURE: 118 MMHG | RESPIRATION RATE: 18 BRPM

## 2020-08-13 DIAGNOSIS — M81.0 AGE-RELATED OSTEOPOROSIS WITHOUT CURRENT PATHOLOGICAL FRACTURE: ICD-10-CM

## 2020-08-13 PROCEDURE — 96372 THER/PROPH/DIAG INJ SC/IM: CPT

## 2020-08-13 RX ORDER — DENOSUMAB 60 MG/ML
60 INJECTION SUBCUTANEOUS ONCE
Refills: 0 | Status: COMPLETED | OUTPATIENT
Start: 2020-08-13 | End: 2020-08-13

## 2020-08-13 RX ADMIN — DENOSUMAB 60 MILLIGRAM(S): 60 INJECTION SUBCUTANEOUS at 11:15

## 2020-08-17 LAB
25(OH)D3 SERPL-MCNC: 70.6 NG/ML
ALBUMIN SERPL ELPH-MCNC: 4.3 G/DL
ALP BLD-CCNC: 64 U/L
ALP BONE SERPL-MCNC: 7.4 MCG/L
ALT SERPL-CCNC: 13 U/L
ANION GAP SERPL CALC-SCNC: 22 MMOL/L
AST SERPL-CCNC: 21 U/L
BASOPHILS # BLD AUTO: 0.06 K/UL
BASOPHILS NFR BLD AUTO: 1 %
BILIRUB SERPL-MCNC: 0.8 MG/DL
BUN SERPL-MCNC: 28 MG/DL
CA-I SERPL-SCNC: 1.27 MMOL/L
CALCIUM SERPL-MCNC: 9.2 MG/DL
CHLORIDE SERPL-SCNC: 100 MMOL/L
CHOLEST SERPL-MCNC: 194 MG/DL
CHOLEST/HDLC SERPL: 3.1 RATIO
CO2 SERPL-SCNC: 18 MMOL/L
COLLAGEN CTX SERPL-MCNC: 126 PG/ML
COLLAGEN NTX UR-SCNC: 22
CREAT SERPL-MCNC: 0.81 MG/DL
CREAT UR-MCNC: 96 MG/DL
EOSINOPHIL # BLD AUTO: 0.22 K/UL
EOSINOPHIL NFR BLD AUTO: 3.5 %
ESTIMATED AVERAGE GLUCOSE: 137 MG/DL
GLUCOSE SERPL-MCNC: 72 MG/DL
HBA1C MFR BLD HPLC: 6.4 %
HCT VFR BLD CALC: 39.2 %
HDLC SERPL-MCNC: 63 MG/DL
HGB BLD-MCNC: 12.3 G/DL
IMM GRANULOCYTES NFR BLD AUTO: 0.2 %
LDLC SERPL CALC-MCNC: 106 MG/DL
LYMPHOCYTES # BLD AUTO: 1.89 K/UL
LYMPHOCYTES NFR BLD AUTO: 30.5 %
MAN DIFF?: NORMAL
MCHC RBC-ENTMCNC: 31.1 PG
MCHC RBC-ENTMCNC: 31.4 GM/DL
MCV RBC AUTO: 99 FL
MONOCYTES # BLD AUTO: 0.56 K/UL
MONOCYTES NFR BLD AUTO: 9 %
NEUTROPHILS # BLD AUTO: 3.46 K/UL
NEUTROPHILS NFR BLD AUTO: 55.8 %
OSTEOCALCIN SERPL-MCNC: <5 NG/ML
PLATELET # BLD AUTO: 198 K/UL
POTASSIUM SERPL-SCNC: 4 MMOL/L
PROT SERPL-MCNC: 6.5 G/DL
RBC # BLD: 3.96 M/UL
RBC # FLD: 14 %
SODIUM SERPL-SCNC: 141 MMOL/L
TRIGL SERPL-MCNC: 124 MG/DL
TSH SERPL-ACNC: 3.09 UIU/ML
WBC # FLD AUTO: 6.2 K/UL

## 2020-08-31 ENCOUNTER — RX RENEWAL (OUTPATIENT)
Age: 80
End: 2020-08-31

## 2020-12-23 PROBLEM — Z87.09 HISTORY OF INFLUENZA: Status: RESOLVED | Noted: 2020-01-24 | Resolved: 2020-12-23

## 2020-12-23 PROBLEM — J06.9 ACUTE URI: Status: RESOLVED | Noted: 2020-02-14 | Resolved: 2020-12-23

## 2021-02-09 ENCOUNTER — APPOINTMENT (OUTPATIENT)
Dept: RHEUMATOLOGY | Facility: CLINIC | Age: 81
End: 2021-02-09
Payer: MEDICARE

## 2021-02-09 VITALS
OXYGEN SATURATION: 97 % | BODY MASS INDEX: 28.55 KG/M2 | DIASTOLIC BLOOD PRESSURE: 71 MMHG | WEIGHT: 136 LBS | SYSTOLIC BLOOD PRESSURE: 110 MMHG | TEMPERATURE: 97.8 F | HEART RATE: 94 BPM | HEIGHT: 58 IN

## 2021-02-09 PROCEDURE — 99213 OFFICE O/P EST LOW 20 MIN: CPT | Mod: 25

## 2021-02-09 PROCEDURE — 36415 COLL VENOUS BLD VENIPUNCTURE: CPT

## 2021-02-09 NOTE — PHYSICAL EXAM
[General Appearance - Alert] : alert [General Appearance - In No Acute Distress] : in no acute distress [Sclera] : the sclera and conjunctiva were normal [PERRL With Normal Accommodation] : pupils were equal in size, round, and reactive to light [Extraocular Movements] : extraocular movements were intact [Outer Ear] : the ears and nose were normal in appearance [Oropharynx] : the oropharynx was normal [Neck Appearance] : the appearance of the neck was normal [Neck Cervical Mass (___cm)] : no neck mass was observed [Jugular Venous Distention Increased] : there was no jugular-venous distention [Thyroid Diffuse Enlargement] : the thyroid was not enlarged [Thyroid Nodule] : there were no palpable thyroid nodules [Auscultation Breath Sounds / Voice Sounds] : lungs were clear to auscultation bilaterally [Heart Rate And Rhythm] : heart rate was normal and rhythm regular [Heart Sounds] : normal S1 and S2 [Heart Sounds Gallop] : no gallops [Murmurs] : no murmurs [Heart Sounds Pericardial Friction Rub] : no pericardial rub [Full Pulse] : the pedal pulses are present [Bowel Sounds] : normal bowel sounds [Edema] : there was no peripheral edema [Abdomen Soft] : soft [Abdomen Tenderness] : non-tender [Abdomen Mass (___ Cm)] : no abdominal mass palpated [No CVA Tenderness] : no ~M costovertebral angle tenderness [No Spinal Tenderness] : no spinal tenderness [Abnormal Walk] : normal gait [Musculoskeletal - Swelling] : no joint swelling seen [Skin Color & Pigmentation] : normal skin color and pigmentation [Skin Turgor] : normal skin turgor [] : no rash [Deep Tendon Reflexes (DTR)] : deep tendon reflexes were 2+ and symmetric [Sensation] : the sensory exam was normal to light touch and pinprick [No Focal Deficits] : no focal deficits [Oriented To Time, Place, And Person] : oriented to person, place, and time

## 2021-02-10 NOTE — HISTORY OF PRESENT ILLNESS
[Arthralgias] : arthralgias [Joint Deformity] : joint deformity [FreeTextEntry1] : Office Visit 8/10/20:\par Will be getting Prolia 8/13\par Overall feels well \par Never went to PT, as our last visit was just before COVID19 outbreat - she wants to get a  though and is interested in exercise. \par Plan: Continue Prolia, submit forms\par Check bone turnover markers - patient fasting\par DEXA 10/20\par Dietary calcium and Vit D\par Encourage weight bearing exercises \par \par Initial Visit;\par Patient was diagnosed by DEXA in 2016 and has no history of fractures. Most recently her BMD was noted to improved in 2018, within osteopenic range. She has no history of fractures. No history of dental procedures or prosthesis. She does mild amounts of cardiovascular exercise but no weight bearing exercises, but is interested in PT which we will refer her to today. She does calcium and vitamin D, and we discussed the importance of dietary calcium and will add supplemental D. Has received one dose of Prolia in the past in August 2019. Today we discussed the importance of medication adherance on Prolia given the risk of vertebral fractures after cessation of the medication or from delayed administration. Will check bone turnover markers to ensure she is responding to Prolia and schedule for mid February 2020.  [Anorexia] : no anorexia [Weight Loss] : no weight loss [Malaise] : no malaise [Fever] : no fever [Chills] : no chills [Fatigue] : no fatigue [Depression] : no depression [Malar Facial Rash] : no malar facial rash [Skin Lesions] : no lesions [Skin Nodules] : no skin nodules [Oral Ulcers] : no oral ulcers [Cough] : no cough [Dry Mouth] : no dry mouth [Dysphonia] : no dysphonia [Dysphagia] : no dysphagia [Shortness of Breath] : no shortness of breath [Chest Pain] : no chest pain [Joint Swelling] : no joint swelling [Joint Warmth] : no joint warmth [Decreased ROM] : no decreased range of motion [Morning Stiffness] : no morning stiffness [Falls] : no falls [Difficulty Standing] : no difficulty standing [Difficulty Walking] : no difficulty walking [Dyspnea] : no dyspnea [Myalgias] : no myalgias [Muscle Weakness] : no muscle weakness [Muscle Spasms] : no muscle spasms [Muscle Cramping] : no muscle cramping [Visual Changes] : no visual changes [Eye Pain] : no eye pain [Eye Redness] : no eye redness [Dry Eyes] : no dry eyes [de-identified] : mild tenderness on left first and second proximal tarsal joints. mild tenderness low back and upper back. mild Heberden nodule left 5th DIP

## 2021-02-10 NOTE — ASSESSMENT
[FreeTextEntry1] : 80 year old female presents for follow up evaluation of osteoporosis.  \par Continues on Prolia \par Using topical Voltaren gel and OA warming gloves\par Doing well \par Check labs and bone turnover markers.

## 2021-02-11 ENCOUNTER — APPOINTMENT (OUTPATIENT)
Age: 81
End: 2021-02-11

## 2021-02-16 LAB
25(OH)D3 SERPL-MCNC: 71.1 NG/ML
ALBUMIN SERPL ELPH-MCNC: 4.3 G/DL
ALP BLD-CCNC: 53 U/L
ALP BONE SERPL-MCNC: 6.2 UG/L
ALT SERPL-CCNC: 9 U/L
ANION GAP SERPL CALC-SCNC: 16 MMOL/L
AST SERPL-CCNC: 19 U/L
BASOPHILS # BLD AUTO: 0.05 K/UL
BASOPHILS NFR BLD AUTO: 0.7 %
BILIRUB SERPL-MCNC: 0.5 MG/DL
BUN SERPL-MCNC: 24 MG/DL
CALCIUM SERPL-MCNC: 8.8 MG/DL
CHLORIDE SERPL-SCNC: 107 MMOL/L
CHOLEST SERPL-MCNC: 177 MG/DL
CO2 SERPL-SCNC: 18 MMOL/L
COLLAGEN CTX SERPL-MCNC: 131 PG/ML
CREAT SERPL-MCNC: 0.93 MG/DL
EOSINOPHIL # BLD AUTO: 0.19 K/UL
EOSINOPHIL NFR BLD AUTO: 2.8 %
ESTIMATED AVERAGE GLUCOSE: 137 MG/DL
GLUCOSE SERPL-MCNC: 113 MG/DL
HBA1C MFR BLD HPLC: 6.4 %
HCT VFR BLD CALC: 40.5 %
HDLC SERPL-MCNC: 59 MG/DL
HGB BLD-MCNC: 12.4 G/DL
IMM GRANULOCYTES NFR BLD AUTO: 0.1 %
LDLC SERPL CALC-MCNC: 92 MG/DL
LYMPHOCYTES # BLD AUTO: 2 K/UL
LYMPHOCYTES NFR BLD AUTO: 29.9 %
MAN DIFF?: NORMAL
MCHC RBC-ENTMCNC: 30.6 GM/DL
MCHC RBC-ENTMCNC: 30.8 PG
MCV RBC AUTO: 100.7 FL
MONOCYTES # BLD AUTO: 0.64 K/UL
MONOCYTES NFR BLD AUTO: 9.6 %
NEUTROPHILS # BLD AUTO: 3.81 K/UL
NEUTROPHILS NFR BLD AUTO: 56.9 %
NONHDLC SERPL-MCNC: 118 MG/DL
OSTEOCALCIN SERPL-MCNC: 1.7 NG/ML
PLATELET # BLD AUTO: 205 K/UL
POTASSIUM SERPL-SCNC: 4 MMOL/L
PROT SERPL-MCNC: 6.4 G/DL
RBC # BLD: 4.02 M/UL
RBC # FLD: 13.7 %
SODIUM SERPL-SCNC: 141 MMOL/L
TRIGL SERPL-MCNC: 127 MG/DL
WBC # FLD AUTO: 6.7 K/UL

## 2021-02-17 LAB
COLLAGEN NTX UR-SCNC: 25
CREAT UR-MCNC: 144 MG/DL

## 2021-03-05 ENCOUNTER — OUTPATIENT (OUTPATIENT)
Dept: OUTPATIENT SERVICES | Facility: HOSPITAL | Age: 81
LOS: 1 days | End: 2021-03-05
Payer: MEDICARE

## 2021-03-05 ENCOUNTER — APPOINTMENT (OUTPATIENT)
Age: 81
End: 2021-03-05

## 2021-03-05 VITALS
SYSTOLIC BLOOD PRESSURE: 112 MMHG | RESPIRATION RATE: 18 BRPM | TEMPERATURE: 98 F | HEART RATE: 86 BPM | DIASTOLIC BLOOD PRESSURE: 66 MMHG | OXYGEN SATURATION: 99 %

## 2021-03-05 DIAGNOSIS — M81.0 AGE-RELATED OSTEOPOROSIS WITHOUT CURRENT PATHOLOGICAL FRACTURE: ICD-10-CM

## 2021-03-05 PROCEDURE — 96372 THER/PROPH/DIAG INJ SC/IM: CPT

## 2021-03-05 RX ORDER — DENOSUMAB 60 MG/ML
60 INJECTION SUBCUTANEOUS ONCE
Refills: 0 | Status: COMPLETED | OUTPATIENT
Start: 2021-03-05 | End: 2021-03-05

## 2021-03-05 RX ADMIN — DENOSUMAB 60 MILLIGRAM(S): 60 INJECTION SUBCUTANEOUS at 11:15

## 2021-03-08 ENCOUNTER — APPOINTMENT (OUTPATIENT)
Age: 81
End: 2021-03-08

## 2021-04-13 ENCOUNTER — EMERGENCY (EMERGENCY)
Facility: HOSPITAL | Age: 81
LOS: 1 days | Discharge: LEFT BEFORE DISCHARGE | End: 2021-04-13
Admitting: EMERGENCY MEDICINE
Payer: MEDICARE

## 2021-04-13 VITALS
SYSTOLIC BLOOD PRESSURE: 114 MMHG | RESPIRATION RATE: 17 BRPM | HEIGHT: 59 IN | HEART RATE: 93 BPM | OXYGEN SATURATION: 95 % | WEIGHT: 134.92 LBS | TEMPERATURE: 98 F | DIASTOLIC BLOOD PRESSURE: 77 MMHG

## 2021-04-13 DIAGNOSIS — S61.220A LACERATION WITH FOREIGN BODY OF RIGHT INDEX FINGER WITHOUT DAMAGE TO NAIL, INITIAL ENCOUNTER: ICD-10-CM

## 2021-04-13 DIAGNOSIS — Z53.21 PROCEDURE AND TREATMENT NOT CARRIED OUT DUE TO PATIENT LEAVING PRIOR TO BEING SEEN BY HEALTH CARE PROVIDER: ICD-10-CM

## 2021-04-13 PROCEDURE — L9993: CPT

## 2021-04-13 PROCEDURE — 99281 EMR DPT VST MAYX REQ PHY/QHP: CPT

## 2021-04-13 NOTE — ED ADULT NURSE NOTE - CHIEF COMPLAINT QUOTE
Today check HIV, syphilis as well as genitourinary chlamydia and gonorrhea  We will test strep before the oral gonorrhea and chlamydia    We will follow-up on the elevated liver tests and also confirm that you have hep B immunity  I will check your kidney function     Return tomorrow for throat swab if the strep is negative  Routine follow-up in three months  
Pt c/o right 2nd finger laceration with a knife today. Not on blood thinners, unknown last tetanus. Denies numbness/tingling.

## 2021-05-26 ENCOUNTER — APPOINTMENT (OUTPATIENT)
Dept: INTERNAL MEDICINE | Facility: CLINIC | Age: 81
End: 2021-05-26
Payer: MEDICARE

## 2021-05-26 PROCEDURE — 36415 COLL VENOUS BLD VENIPUNCTURE: CPT

## 2021-05-28 ENCOUNTER — RX RENEWAL (OUTPATIENT)
Age: 81
End: 2021-05-28

## 2021-05-28 LAB
25(OH)D3 SERPL-MCNC: 77 NG/ML
ALBUMIN SERPL ELPH-MCNC: 4.5 G/DL
ALP BLD-CCNC: 60 U/L
ALT SERPL-CCNC: 15 U/L
ANION GAP SERPL CALC-SCNC: 12 MMOL/L
APPEARANCE: CLEAR
AST SERPL-CCNC: 21 U/L
BACTERIA: ABNORMAL
BASOPHILS # BLD AUTO: 0.06 K/UL
BASOPHILS NFR BLD AUTO: 1 %
BILIRUB SERPL-MCNC: 0.9 MG/DL
BILIRUBIN URINE: NEGATIVE
BLOOD URINE: NEGATIVE
BUN SERPL-MCNC: 22 MG/DL
CALCIUM SERPL-MCNC: 9.4 MG/DL
CHLORIDE SERPL-SCNC: 102 MMOL/L
CHOLEST SERPL-MCNC: 190 MG/DL
CO2 SERPL-SCNC: 24 MMOL/L
COLOR: NORMAL
CREAT SERPL-MCNC: 0.85 MG/DL
EOSINOPHIL # BLD AUTO: 0.24 K/UL
EOSINOPHIL NFR BLD AUTO: 4.1 %
ESTIMATED AVERAGE GLUCOSE: 140 MG/DL
GLUCOSE QUALITATIVE U: NEGATIVE
GLUCOSE SERPL-MCNC: 110 MG/DL
HBA1C MFR BLD HPLC: 6.5 %
HCT VFR BLD CALC: 41.6 %
HDLC SERPL-MCNC: 69 MG/DL
HGB BLD-MCNC: 12.6 G/DL
HYALINE CASTS: 0 /LPF
IMM GRANULOCYTES NFR BLD AUTO: 0.2 %
KETONES URINE: NEGATIVE
LDLC SERPL CALC-MCNC: 96 MG/DL
LEUKOCYTE ESTERASE URINE: NEGATIVE
LYMPHOCYTES # BLD AUTO: 1.82 K/UL
LYMPHOCYTES NFR BLD AUTO: 31 %
MAN DIFF?: NORMAL
MCHC RBC-ENTMCNC: 30.3 GM/DL
MCHC RBC-ENTMCNC: 30.5 PG
MCV RBC AUTO: 100.7 FL
MICROSCOPIC-UA: NORMAL
MONOCYTES # BLD AUTO: 0.64 K/UL
MONOCYTES NFR BLD AUTO: 10.9 %
NEUTROPHILS # BLD AUTO: 3.11 K/UL
NEUTROPHILS NFR BLD AUTO: 52.8 %
NITRITE URINE: NEGATIVE
NONHDLC SERPL-MCNC: 121 MG/DL
PH URINE: 5.5
PLATELET # BLD AUTO: 217 K/UL
POTASSIUM SERPL-SCNC: 4.8 MMOL/L
PROT SERPL-MCNC: 6.6 G/DL
PROTEIN URINE: NEGATIVE
RBC # BLD: 4.13 M/UL
RBC # FLD: 14 %
RED BLOOD CELLS URINE: 1 /HPF
SODIUM SERPL-SCNC: 139 MMOL/L
SPECIFIC GRAVITY URINE: 1.02
SQUAMOUS EPITHELIAL CELLS: 0 /HPF
T4 FREE SERPL-MCNC: 1.4 NG/DL
TRIGL SERPL-MCNC: 127 MG/DL
TSH SERPL-ACNC: 2.79 UIU/ML
UROBILINOGEN URINE: NORMAL
WBC # FLD AUTO: 5.88 K/UL
WHITE BLOOD CELLS URINE: 0 /HPF

## 2021-06-02 ENCOUNTER — RX RENEWAL (OUTPATIENT)
Age: 81
End: 2021-06-02

## 2021-06-02 ENCOUNTER — APPOINTMENT (OUTPATIENT)
Dept: INTERNAL MEDICINE | Facility: CLINIC | Age: 81
End: 2021-06-02
Payer: MEDICARE

## 2021-06-02 VITALS
TEMPERATURE: 97.3 F | HEIGHT: 58 IN | OXYGEN SATURATION: 97 % | SYSTOLIC BLOOD PRESSURE: 94 MMHG | DIASTOLIC BLOOD PRESSURE: 62 MMHG | HEART RATE: 85 BPM | BODY MASS INDEX: 28.34 KG/M2 | WEIGHT: 135 LBS

## 2021-06-02 DIAGNOSIS — Z00.00 ENCOUNTER FOR GENERAL ADULT MEDICAL EXAMINATION W/OUT ABNORMAL FINDINGS: ICD-10-CM

## 2021-06-02 DIAGNOSIS — Z23 ENCOUNTER FOR IMMUNIZATION: ICD-10-CM

## 2021-06-02 PROCEDURE — G0439: CPT

## 2021-06-02 NOTE — HEALTH RISK ASSESSMENT
[No] : No [No falls in past year] : Patient reported no falls in the past year [0] : 2) Feeling down, depressed, or hopeless: Not at all (0) [Patient reported mammogram was normal] : Patient reported mammogram was normal [Patient reported bone density results were abnormal] : Patient reported bone density results were abnormal [Patient reported colonoscopy was normal] : Patient reported colonoscopy was normal [] : No [[id number]] : 0 [MammogramDate] : 08/2019 [BoneDensityDate] : 10/2020 [ColonoscopyDate] : 03/2019

## 2021-06-02 NOTE — ASSESSMENT
[FreeTextEntry1] : A1C Noted; \par Will start Metformin 850 a day; Will see Endocrinology re osteoporosis \par Repeat A1C 4 weeks\par

## 2021-06-02 NOTE — HISTORY OF PRESENT ILLNESS
[FreeTextEntry1] : Labs reviewed with patient;\par A1C remains increased\par Should probably on Metformin \par  [de-identified] : As above not much exercise\par Still using Prolia

## 2021-06-25 ENCOUNTER — RX RENEWAL (OUTPATIENT)
Age: 81
End: 2021-06-25

## 2021-07-07 ENCOUNTER — RX RENEWAL (OUTPATIENT)
Age: 81
End: 2021-07-07

## 2021-07-22 ENCOUNTER — APPOINTMENT (OUTPATIENT)
Dept: INTERNAL MEDICINE | Facility: CLINIC | Age: 81
End: 2021-07-22
Payer: MEDICARE

## 2021-07-22 LAB
25(OH)D3 SERPL-MCNC: 87.4 NG/ML
ALBUMIN SERPL ELPH-MCNC: 4.4 G/DL
ALP BLD-CCNC: 61 U/L
ALT SERPL-CCNC: 15 U/L
ANION GAP SERPL CALC-SCNC: 12 MMOL/L
APPEARANCE: CLEAR
AST SERPL-CCNC: 18 U/L
BACTERIA: NEGATIVE
BASOPHILS # BLD AUTO: 0.06 K/UL
BASOPHILS NFR BLD AUTO: 0.5 %
BILIRUB SERPL-MCNC: 0.7 MG/DL
BILIRUBIN URINE: NEGATIVE
BLOOD URINE: NEGATIVE
BUN SERPL-MCNC: 24 MG/DL
CALCIUM SERPL-MCNC: 9.9 MG/DL
CHLORIDE SERPL-SCNC: 100 MMOL/L
CHOLEST SERPL-MCNC: 189 MG/DL
CO2 SERPL-SCNC: 26 MMOL/L
COLOR: NORMAL
CREAT SERPL-MCNC: 0.82 MG/DL
EOSINOPHIL # BLD AUTO: 0.21 K/UL
EOSINOPHIL NFR BLD AUTO: 1.7 %
ESTIMATED AVERAGE GLUCOSE: 137 MG/DL
GLUCOSE QUALITATIVE U: NEGATIVE
GLUCOSE SERPL-MCNC: 103 MG/DL
HBA1C MFR BLD HPLC: 6.4 %
HCT VFR BLD CALC: 40.1 %
HDLC SERPL-MCNC: 81 MG/DL
HGB BLD-MCNC: 12.7 G/DL
HYALINE CASTS: 0 /LPF
IMM GRANULOCYTES NFR BLD AUTO: 0.2 %
KETONES URINE: NEGATIVE
LDLC SERPL CALC-MCNC: 84 MG/DL
LEUKOCYTE ESTERASE URINE: NEGATIVE
LYMPHOCYTES # BLD AUTO: 1.67 K/UL
LYMPHOCYTES NFR BLD AUTO: 13.8 %
MAN DIFF?: NORMAL
MCHC RBC-ENTMCNC: 30.6 PG
MCHC RBC-ENTMCNC: 31.7 GM/DL
MCV RBC AUTO: 96.6 FL
MICROSCOPIC-UA: NORMAL
MONOCYTES # BLD AUTO: 1.04 K/UL
MONOCYTES NFR BLD AUTO: 8.6 %
NEUTROPHILS # BLD AUTO: 9.12 K/UL
NEUTROPHILS NFR BLD AUTO: 75.2 %
NITRITE URINE: NEGATIVE
NONHDLC SERPL-MCNC: 108 MG/DL
PH URINE: 6
PLATELET # BLD AUTO: 225 K/UL
POTASSIUM SERPL-SCNC: 4.5 MMOL/L
PROT SERPL-MCNC: 6.6 G/DL
PROTEIN URINE: NEGATIVE
RBC # BLD: 4.15 M/UL
RBC # FLD: 13.5 %
RED BLOOD CELLS URINE: 0 /HPF
SODIUM SERPL-SCNC: 137 MMOL/L
SPECIFIC GRAVITY URINE: 1.02
SQUAMOUS EPITHELIAL CELLS: 0 /HPF
T4 FREE SERPL-MCNC: 1.4 NG/DL
TRIGL SERPL-MCNC: 118 MG/DL
TSH SERPL-ACNC: 2.49 UIU/ML
UROBILINOGEN URINE: NORMAL
WBC # FLD AUTO: 12.13 K/UL
WHITE BLOOD CELLS URINE: 0 /HPF

## 2021-07-22 PROCEDURE — 36415 COLL VENOUS BLD VENIPUNCTURE: CPT

## 2021-07-28 ENCOUNTER — APPOINTMENT (OUTPATIENT)
Dept: INTERNAL MEDICINE | Facility: CLINIC | Age: 81
End: 2021-07-28
Payer: MEDICARE

## 2021-07-28 ENCOUNTER — APPOINTMENT (OUTPATIENT)
Dept: DERMATOLOGY | Facility: CLINIC | Age: 81
End: 2021-07-28
Payer: MEDICARE

## 2021-07-28 VITALS
HEIGHT: 58 IN | HEART RATE: 77 BPM | BODY MASS INDEX: 28.34 KG/M2 | TEMPERATURE: 97.9 F | OXYGEN SATURATION: 96 % | SYSTOLIC BLOOD PRESSURE: 96 MMHG | WEIGHT: 135 LBS | DIASTOLIC BLOOD PRESSURE: 58 MMHG

## 2021-07-28 DIAGNOSIS — D23.9 OTHER BENIGN NEOPLASM OF SKIN, UNSPECIFIED: ICD-10-CM

## 2021-07-28 DIAGNOSIS — Z23 ENCOUNTER FOR IMMUNIZATION: ICD-10-CM

## 2021-07-28 PROCEDURE — 99213 OFFICE O/P EST LOW 20 MIN: CPT | Mod: 25

## 2021-07-28 PROCEDURE — 99202 OFFICE O/P NEW SF 15 MIN: CPT

## 2021-07-28 PROCEDURE — 90471 IMMUNIZATION ADMIN: CPT

## 2021-07-28 PROCEDURE — 90715 TDAP VACCINE 7 YRS/> IM: CPT | Mod: GY

## 2021-07-28 NOTE — ASSESSMENT
[FreeTextEntry1] : Discussed diabetes with patient and osteoporosis ; She will see Dr. Bhatti and discuss both issues;\par For now just diet for diabetes and no meds;\par TDAP given

## 2021-07-28 NOTE — HISTORY OF PRESENT ILLNESS
[FreeTextEntry1] : All labs reviewed; \par A1C 6.4\par Will see Dr Bhatti in Sept; \par Getting Prolia [de-identified] : When she sees Dr. Bhatti she will discuss the A1C\par Last bone density Oct 2020\par Will get Tdap today

## 2021-07-29 PROBLEM — D23.9 DERMATOFIBROMA: Status: ACTIVE | Noted: 2021-07-29

## 2021-08-17 ENCOUNTER — EMERGENCY (EMERGENCY)
Facility: HOSPITAL | Age: 81
LOS: 1 days | Discharge: HOME | End: 2021-08-17
Attending: EMERGENCY MEDICINE | Admitting: EMERGENCY MEDICINE
Payer: MEDICARE

## 2021-08-17 ENCOUNTER — NON-APPOINTMENT (OUTPATIENT)
Age: 81
End: 2021-08-17

## 2021-08-17 VITALS
DIASTOLIC BLOOD PRESSURE: 68 MMHG | TEMPERATURE: 98 F | OXYGEN SATURATION: 98 % | SYSTOLIC BLOOD PRESSURE: 104 MMHG | RESPIRATION RATE: 17 BRPM | HEART RATE: 76 BPM

## 2021-08-17 VITALS
DIASTOLIC BLOOD PRESSURE: 66 MMHG | OXYGEN SATURATION: 98 % | RESPIRATION RATE: 16 BRPM | HEIGHT: 59 IN | HEART RATE: 98 BPM | TEMPERATURE: 99 F | SYSTOLIC BLOOD PRESSURE: 115 MMHG | WEIGHT: 130.07 LBS

## 2021-08-17 DIAGNOSIS — Z20.822 CONTACT WITH AND (SUSPECTED) EXPOSURE TO COVID-19: ICD-10-CM

## 2021-08-17 DIAGNOSIS — K21.9 GASTRO-ESOPHAGEAL REFLUX DISEASE WITHOUT ESOPHAGITIS: ICD-10-CM

## 2021-08-17 DIAGNOSIS — I10 ESSENTIAL (PRIMARY) HYPERTENSION: ICD-10-CM

## 2021-08-17 DIAGNOSIS — Z87.19 PERSONAL HISTORY OF OTHER DISEASES OF THE DIGESTIVE SYSTEM: ICD-10-CM

## 2021-08-17 DIAGNOSIS — Z88.2 ALLERGY STATUS TO SULFONAMIDES: ICD-10-CM

## 2021-08-17 DIAGNOSIS — R10.9 UNSPECIFIED ABDOMINAL PAIN: ICD-10-CM

## 2021-08-17 DIAGNOSIS — K52.9 NONINFECTIVE GASTROENTERITIS AND COLITIS, UNSPECIFIED: ICD-10-CM

## 2021-08-17 DIAGNOSIS — E78.5 HYPERLIPIDEMIA, UNSPECIFIED: ICD-10-CM

## 2021-08-17 LAB
ALBUMIN SERPL ELPH-MCNC: 4.1 G/DL — SIGNIFICANT CHANGE UP (ref 3.3–5)
ALP SERPL-CCNC: 51 U/L — SIGNIFICANT CHANGE UP (ref 40–120)
ALT FLD-CCNC: 17 U/L — SIGNIFICANT CHANGE UP (ref 10–45)
ANION GAP SERPL CALC-SCNC: 8 MMOL/L — SIGNIFICANT CHANGE UP (ref 5–17)
AST SERPL-CCNC: 26 U/L — SIGNIFICANT CHANGE UP (ref 10–40)
BASOPHILS # BLD AUTO: 0.05 K/UL — SIGNIFICANT CHANGE UP (ref 0–0.2)
BASOPHILS NFR BLD AUTO: 0.3 % — SIGNIFICANT CHANGE UP (ref 0–2)
BILIRUB SERPL-MCNC: 1 MG/DL — SIGNIFICANT CHANGE UP (ref 0.2–1.2)
BUN SERPL-MCNC: 35 MG/DL — HIGH (ref 7–23)
CALCIUM SERPL-MCNC: 9.6 MG/DL — SIGNIFICANT CHANGE UP (ref 8.4–10.5)
CHLORIDE SERPL-SCNC: 104 MMOL/L — SIGNIFICANT CHANGE UP (ref 96–108)
CO2 SERPL-SCNC: 26 MMOL/L — SIGNIFICANT CHANGE UP (ref 22–31)
CREAT SERPL-MCNC: 1.04 MG/DL — SIGNIFICANT CHANGE UP (ref 0.5–1.3)
EOSINOPHIL # BLD AUTO: 0.27 K/UL — SIGNIFICANT CHANGE UP (ref 0–0.5)
EOSINOPHIL NFR BLD AUTO: 1.8 % — SIGNIFICANT CHANGE UP (ref 0–6)
GLUCOSE SERPL-MCNC: 128 MG/DL — HIGH (ref 70–99)
HCT VFR BLD CALC: 37.8 % — SIGNIFICANT CHANGE UP (ref 34.5–45)
HGB BLD-MCNC: 12.5 G/DL — SIGNIFICANT CHANGE UP (ref 11.5–15.5)
IMM GRANULOCYTES NFR BLD AUTO: 0.5 % — SIGNIFICANT CHANGE UP (ref 0–1.5)
LIDOCAIN IGE QN: 20 U/L — SIGNIFICANT CHANGE UP (ref 7–60)
LYMPHOCYTES # BLD AUTO: 14 % — SIGNIFICANT CHANGE UP (ref 13–44)
LYMPHOCYTES # BLD AUTO: 2.12 K/UL — SIGNIFICANT CHANGE UP (ref 1–3.3)
MCHC RBC-ENTMCNC: 31.1 PG — SIGNIFICANT CHANGE UP (ref 27–34)
MCHC RBC-ENTMCNC: 33.1 GM/DL — SIGNIFICANT CHANGE UP (ref 32–36)
MCV RBC AUTO: 94 FL — SIGNIFICANT CHANGE UP (ref 80–100)
MONOCYTES # BLD AUTO: 1.45 K/UL — HIGH (ref 0–0.9)
MONOCYTES NFR BLD AUTO: 9.6 % — SIGNIFICANT CHANGE UP (ref 2–14)
NEUTROPHILS # BLD AUTO: 11.13 K/UL — HIGH (ref 1.8–7.4)
NEUTROPHILS NFR BLD AUTO: 73.8 % — SIGNIFICANT CHANGE UP (ref 43–77)
NRBC # BLD: 0 /100 WBCS — SIGNIFICANT CHANGE UP (ref 0–0)
PLATELET # BLD AUTO: 201 K/UL — SIGNIFICANT CHANGE UP (ref 150–400)
POTASSIUM SERPL-MCNC: 4.7 MMOL/L — SIGNIFICANT CHANGE UP (ref 3.5–5.3)
POTASSIUM SERPL-SCNC: 4.7 MMOL/L — SIGNIFICANT CHANGE UP (ref 3.5–5.3)
PROT SERPL-MCNC: 6.6 G/DL — SIGNIFICANT CHANGE UP (ref 6–8.3)
RBC # BLD: 4.02 M/UL — SIGNIFICANT CHANGE UP (ref 3.8–5.2)
RBC # FLD: 13.2 % — SIGNIFICANT CHANGE UP (ref 10.3–14.5)
SARS-COV-2 RNA SPEC QL NAA+PROBE: SIGNIFICANT CHANGE UP
SODIUM SERPL-SCNC: 138 MMOL/L — SIGNIFICANT CHANGE UP (ref 135–145)
WBC # BLD: 15.1 K/UL — HIGH (ref 3.8–10.5)
WBC # FLD AUTO: 15.1 K/UL — HIGH (ref 3.8–10.5)

## 2021-08-17 PROCEDURE — 83690 ASSAY OF LIPASE: CPT

## 2021-08-17 PROCEDURE — 99284 EMERGENCY DEPT VISIT MOD MDM: CPT

## 2021-08-17 PROCEDURE — 80053 COMPREHEN METABOLIC PANEL: CPT

## 2021-08-17 PROCEDURE — 85025 COMPLETE CBC W/AUTO DIFF WBC: CPT

## 2021-08-17 PROCEDURE — 36415 COLL VENOUS BLD VENIPUNCTURE: CPT

## 2021-08-17 PROCEDURE — 96360 HYDRATION IV INFUSION INIT: CPT | Mod: XU

## 2021-08-17 PROCEDURE — 96361 HYDRATE IV INFUSION ADD-ON: CPT

## 2021-08-17 PROCEDURE — 99284 EMERGENCY DEPT VISIT MOD MDM: CPT | Mod: 25

## 2021-08-17 PROCEDURE — 87635 SARS-COV-2 COVID-19 AMP PRB: CPT

## 2021-08-17 PROCEDURE — 74177 CT ABD & PELVIS W/CONTRAST: CPT | Mod: 26,MA

## 2021-08-17 PROCEDURE — 74177 CT ABD & PELVIS W/CONTRAST: CPT | Mod: MA

## 2021-08-17 RX ORDER — SODIUM CHLORIDE 9 MG/ML
1000 INJECTION INTRAMUSCULAR; INTRAVENOUS; SUBCUTANEOUS ONCE
Refills: 0 | Status: COMPLETED | OUTPATIENT
Start: 2021-08-17 | End: 2021-08-17

## 2021-08-17 RX ORDER — LISINOPRIL 2.5 MG/1
0 TABLET ORAL
Qty: 0 | Refills: 0 | DISCHARGE

## 2021-08-17 RX ORDER — ATORVASTATIN CALCIUM 80 MG/1
0 TABLET, FILM COATED ORAL
Qty: 0 | Refills: 0 | DISCHARGE

## 2021-08-17 RX ORDER — IOHEXOL 300 MG/ML
30 INJECTION, SOLUTION INTRAVENOUS ONCE
Refills: 0 | Status: COMPLETED | OUTPATIENT
Start: 2021-08-17 | End: 2021-08-17

## 2021-08-17 RX ORDER — ESOMEPRAZOLE MAGNESIUM 40 MG/1
0 CAPSULE, DELAYED RELEASE ORAL
Qty: 0 | Refills: 0 | DISCHARGE

## 2021-08-17 RX ADMIN — SODIUM CHLORIDE 1000 MILLILITER(S): 9 INJECTION INTRAMUSCULAR; INTRAVENOUS; SUBCUTANEOUS at 18:43

## 2021-08-17 RX ADMIN — Medication 1 TABLET(S): at 22:57

## 2021-08-17 RX ADMIN — IOHEXOL 30 MILLILITER(S): 300 INJECTION, SOLUTION INTRAVENOUS at 18:43

## 2021-08-17 RX ADMIN — SODIUM CHLORIDE 1000 MILLILITER(S): 9 INJECTION INTRAMUSCULAR; INTRAVENOUS; SUBCUTANEOUS at 22:05

## 2021-08-17 NOTE — ED PROVIDER NOTE - SHIFT CHANGE DETAILS
80 f with hx tics/htn/hemorrhoids c/o abd cramps with diarrhea and some bloody bm since last night.  no bleeding in ED.  bp initially soft.  currently asymptomatic, awaiting labs and CT scan results.  if remains HDS and no actionable findings, may be able to go home. 80 f with hx tics/htn/hemorrhoids c/o abd cramps with diarrhea and some bloody bm since last night.  no bleeding in ED.  bp initially soft.  benign abd exam. currently asymptomatic, awaiting labs and CT scan results.  if remains HDS and no actionable findings, may be able to go home.

## 2021-08-17 NOTE — ED ADULT NURSE NOTE - OBJECTIVE STATEMENT
Patient arrives ambulatory reporting lower abd pain/ cramping that began yesterday, associated with multiple episodes of diarrhea.  Patient reports later in the day she noticed some red blood in her BM.  Hx diverticulitis.  Patient reports colonoscopy in May 2021, patient spoke with her doctor who told her there were some hemorrhoids and its possible that is causing the bleeding, but recommended she come to the ED for evaluation.  Denies syncope/blood thinners/n/v/f/chills.

## 2021-08-17 NOTE — ED PROVIDER NOTE - PATIENT PORTAL LINK FT
You can access the FollowMyHealth Patient Portal offered by [facility] by registering at the following website: [email or web address]. By joining [facility]’s FollowMyHealth portal, you will also be able to view your health information using other applications (apps) compatible with our system.

## 2021-08-17 NOTE — ED PROVIDER NOTE - CLINICAL SUMMARY MEDICAL DECISION MAKING FREE TEXT BOX
episode of crampy abd  pain followed by diarrhea followed by blood per rectum. possible enteritis/ colitis/ diverticulitis/ diverticular bleed. rectal now with brown stool, no blood, abdomen soft/ non tender. labs obtained to eval anemia. ct to eval diverticulitis/ colitis. signed out to Dr. Mckeon/ FRANCISCA Mack pending results/ imaging and reassessment.

## 2021-08-17 NOTE — ED ADULT NURSE NOTE - NSICDXPASTMEDICALHX_GEN_ALL_CORE_FT
PAST MEDICAL HISTORY:  GERD (gastroesophageal reflux disease)     HLD (hyperlipidemia)     HTN (hypertension)      PAST MEDICAL HISTORY:  GERD (gastroesophageal reflux disease)     Hiatal hernia with GERD     HLD (hyperlipidemia)     HTN (hypertension)

## 2021-08-17 NOTE — ED PROVIDER NOTE - PROGRESS NOTE DETAILS
Frederick: pt has asked several times to be discharged over the past hour.  Final CT report showing colitis.  Test results discussed with her.  She says she feels better, not having pain now and not currently bleeding.  Discussed her fairly low BP, she states her systolic BP always runs 90s-120 on her BP meds; she was not orthostatic on her recent vitals.  She denies feeling weak or lightheaded.  Not anemic on labs.  No recent abx or hospitalization to suggest c. diff.  Discussed with Dr Baca, will DC home on Augmentin for colitis and he will call her tomorrow.

## 2021-08-17 NOTE — ED PROVIDER NOTE - GASTROINTESTINAL, MLM
Abdomen soft, non-tender, no guarding. rectal with brown stool, no blood per rectum. no visible hemorrhoid.

## 2021-08-17 NOTE — ED ADULT NURSE REASSESSMENT NOTE - NS ED NURSE REASSESS COMMENT FT1
Patient asking if she can check out and follow up with her own doctor.  FRANCISCA Mack at the bedside.

## 2021-08-17 NOTE — ED ADULT NURSE REASSESSMENT NOTE - NS ED NURSE REASSESS COMMENT FT1
Patient requesting results multiple times, informed waiting for final CT scan result.  Patient states she has been here since 4pm and "its getting to the point where I can't stand it anymore, I'd like to leave."  FRANCISCA Mack informed.

## 2021-08-17 NOTE — ED PROVIDER NOTE - CARE PROVIDER_API CALL
Ely Baca)  Critical Care Medicine; Internal Medicine  [address], [address]  [place], [place] [address]  Phone: [phone]  Fax: [phone]  Follow Up Time:

## 2021-08-17 NOTE — ED PROVIDER NOTE - OBJECTIVE STATEMENT
history of HTN, diverticulitis, here with crampy abdominal pain last night for several hours followed by diarrhea followed by blood per rectum. States was bright red. Pain has subsided but has continued to have several episodes of blood per rectum today. Reports history of hemorrhoids on colonoscopy earlier this year. No fever, chills, vomiting. Not on blood thinner medicines.

## 2021-08-17 NOTE — ED PROVIDER NOTE - NSFOLLOWUPINSTRUCTIONS_ED_ALL_ED_FT
Increase fluid intake by mouth.  Take the antibiotic as prescribed.  Follow up with Dr Baca tomorrow - he will call you, but if you do not hear from him, please call him.   Return to the Emergency Department if you have any new or worsening symptoms, or if you have any concerns.  =====================    Colitis    WHAT YOU NEED TO KNOW:    Colitis is swelling and irritation of your colon. Colitis may be caused by ulcers or a problem with your immune system. Bacteria, a virus, or a parasite may also cause colitis. The cause may not be known. You may have diarrhea, abdominal pain, fever, or blood or mucus in your bowel movement.    DISCHARGE INSTRUCTIONS:    Return to the emergency department if:   •You have sudden trouble breathing.      •Your bowel movements are black or have blood in them.      •You have blood in your vomit.      •You have severe abdominal pain or your abdomen is swollen and feels hard.      •You have any of the following signs of dehydration: ?Dizziness or weakness      ?Dry mouth, cracked lips, or severe thirst      ?Fast heartbeat or breathing      ?Urinating very little or not at all        Call your doctor if:   •Your symptoms get worse or do not go away.      •You have a fever, chills, cough, or feel weak and achy.      •You suddenly lose weight without trying.      •You have questions or concerns about your condition or care.      Medicines:   •Medicines may be given to decrease inflammation in your colon and treat diarrhea.      •Take your medicine as directed. Contact your healthcare provider if you think your medicine is not helping or if you have side effects. Tell him of her if you are allergic to any medicine. Keep a list of the medicines, vitamins, and herbs you take. Include the amounts, and when and why you take them. Bring the list or the pill bottles to follow-up visits. Carry your medicine list with you in case of an emergency.      Manage your symptoms:   •Drink liquids as directed to help prevent dehydration. Good liquids to drink include water, juice, and broth. Ask how much liquid to drink each day. You may need to drink an oral rehydration solution (ORS). An ORS contains a balance of water, salt, and sugar to replace body fluids lost during diarrhea.      •Eat a variety of healthy foods. Healthy foods include fruits, vegetables, whole-grain breads, beans, low-fat dairy products, lean meats, and fish. You may need to eat several small meals throughout the day instead of large meals. Avoid spicy foods, caffeine, chocolate, and foods high in fat.      •Talk to your healthcare provider before you take NSAIDs. NSAIDs can cause worsen your symptoms if ulcers are causing your colitis.      •Start to exercise when you feel better. Regular exercise helps your bowels work normally. Ask about the best exercise plan for you.      Prevent the spread of germs:          •Wash your hands often. Wash your hands several times each day. Wash after you use the bathroom, change a child's diaper, and before you prepare or eat food. Use soap and water every time. Rub your soapy hands together, lacing your fingers. Wash the front and back of your hands, and in between your fingers. Use the fingers of one hand to scrub under the fingernails of the other hand. Wash for at least 20 seconds. Rinse with warm, running water for several seconds. Then dry your hands with a clean towel or paper towel. Use hand  that contains alcohol if soap and water are not available. Do not touch your eyes, nose, or mouth without washing your hands first.  Handwashing           •Cover a sneeze or cough. Use a tissue that covers your mouth and nose. Throw the tissue away in a trash can right away. Use the bend of your arm if a tissue is not available. Wash your hands well with soap and water or use a hand .      •Clean surfaces often. Clean doorknobs, countertops, cell phones, and other surfaces that are touched often. Use a disinfecting wipe, a single-use sponge, or a cloth you can wash and reuse. Use disinfecting  if you do not have wipes. You can create a disinfecting  by mixing 1 part bleach with 10 parts water.      •Ask about vaccines you may need. Vaccines help prevent disease caused by some viruses and bacteria. Get the influenza (flu) vaccine as soon as recommended each year. The flu vaccine is usually available starting in September or October. Flu viruses change, so it is important to get a flu vaccine every year. Get the pneumonia vaccine if recommended. This vaccine is usually recommended every 5 years. Your provider will tell you when to get this vaccine, if needed. Your healthcare provider can tell you if you should get other vaccines, and when to get them.      Follow up with your doctor as directed: You may need to return for a colonoscopy or other tests. Write down how often you have a bowel movements and what they look like. Bring this to your follow-up visits. Write down your questions so you remember to ask them during your visits.

## 2021-08-17 NOTE — ED ADULT TRIAGE NOTE - CHIEF COMPLAINT QUOTE
Patient states had abdominal cramps yesterday w/ diarrhea, noted some blood, today still have some rectal bleed even without having a BM.  Denies any dizziness or lightheadedness, states had colonoscopy last May and was told has hemmorrhoids.  States has Hx of diverticulitis.

## 2021-09-08 ENCOUNTER — OUTPATIENT (OUTPATIENT)
Dept: OUTPATIENT SERVICES | Facility: HOSPITAL | Age: 81
LOS: 1 days | End: 2021-09-08
Payer: MEDICARE

## 2021-09-08 ENCOUNTER — APPOINTMENT (OUTPATIENT)
Age: 81
End: 2021-09-08

## 2021-09-08 VITALS
TEMPERATURE: 98 F | DIASTOLIC BLOOD PRESSURE: 68 MMHG | HEART RATE: 76 BPM | SYSTOLIC BLOOD PRESSURE: 104 MMHG | RESPIRATION RATE: 18 BRPM | OXYGEN SATURATION: 96 %

## 2021-09-08 DIAGNOSIS — M81.0 AGE-RELATED OSTEOPOROSIS WITHOUT CURRENT PATHOLOGICAL FRACTURE: ICD-10-CM

## 2021-09-08 PROCEDURE — 96372 THER/PROPH/DIAG INJ SC/IM: CPT

## 2021-09-08 RX ORDER — DENOSUMAB 60 MG/ML
60 INJECTION SUBCUTANEOUS ONCE
Refills: 0 | Status: COMPLETED | OUTPATIENT
Start: 2021-09-08 | End: 2021-09-08

## 2021-09-08 RX ADMIN — DENOSUMAB 60 MILLIGRAM(S): 60 INJECTION SUBCUTANEOUS at 11:20

## 2021-09-10 ENCOUNTER — RX RENEWAL (OUTPATIENT)
Age: 81
End: 2021-09-10

## 2021-09-21 ENCOUNTER — APPOINTMENT (OUTPATIENT)
Dept: ENDOCRINOLOGY | Facility: CLINIC | Age: 81
End: 2021-09-21
Payer: MEDICARE

## 2021-09-21 ENCOUNTER — NON-APPOINTMENT (OUTPATIENT)
Age: 81
End: 2021-09-21

## 2021-09-21 VITALS — SYSTOLIC BLOOD PRESSURE: 99 MMHG | HEIGHT: 58 IN | DIASTOLIC BLOOD PRESSURE: 65 MMHG | HEART RATE: 78 BPM

## 2021-09-21 PROBLEM — K21.9 GASTRO-ESOPHAGEAL REFLUX DISEASE WITHOUT ESOPHAGITIS: Chronic | Status: ACTIVE | Noted: 2021-08-17

## 2021-09-21 PROCEDURE — 99215 OFFICE O/P EST HI 40 MIN: CPT

## 2021-09-21 RX ORDER — PNEUMOCOCCAL 23-VAL P-SAC VAC 25MCG/0.5
25 VIAL (ML) INJECTION
Qty: 1 | Refills: 0 | Status: DISCONTINUED | COMMUNITY
Start: 2019-11-15 | End: 2021-09-21

## 2021-09-21 RX ORDER — OMEPRAZOLE 20 MG/1
20 CAPSULE, DELAYED RELEASE ORAL
Qty: 180 | Refills: 0 | Status: DISCONTINUED | COMMUNITY
Start: 2017-02-02 | End: 2021-09-21

## 2021-09-21 RX ORDER — OSELTAMIVIR PHOSPHATE 75 MG/1
75 CAPSULE ORAL TWICE DAILY
Qty: 10 | Refills: 0 | Status: DISCONTINUED | COMMUNITY
Start: 2020-01-24 | End: 2021-09-21

## 2021-09-21 RX ORDER — AZITHROMYCIN 250 MG/1
250 TABLET, FILM COATED ORAL
Qty: 6 | Refills: 1 | Status: DISCONTINUED | COMMUNITY
Start: 2020-05-21 | End: 2021-09-21

## 2021-09-21 RX ORDER — AZITHROMYCIN 250 MG/1
250 TABLET, FILM COATED ORAL
Qty: 6 | Refills: 1 | Status: DISCONTINUED | COMMUNITY
Start: 2020-02-14 | End: 2021-09-21

## 2021-09-21 RX ORDER — ASPIRIN 81 MG
600-200 TABLET, DELAYED RELEASE (ENTERIC COATED) ORAL DAILY
Qty: 30 | Refills: 0 | Status: DISCONTINUED | COMMUNITY
Start: 2019-03-20 | End: 2021-09-21

## 2021-09-21 RX ORDER — MULTIVITAMIN
TABLET ORAL
Refills: 0 | Status: ACTIVE | COMMUNITY

## 2021-09-21 RX ORDER — BACILLUS COAGULANS/INULIN 1B-250 MG
CAPSULE ORAL
Refills: 0 | Status: ACTIVE | COMMUNITY

## 2021-09-21 RX ORDER — GLUCOSA SU 2KCL/CHONDROITIN SU 250-200 MG
CAPSULE ORAL
Refills: 0 | Status: ACTIVE | COMMUNITY

## 2021-09-21 RX ORDER — MULTIVIT-MIN/FOLIC/VIT K/LYCOP 400-300MCG
25 MCG TABLET ORAL
Qty: 90 | Refills: 2 | Status: DISCONTINUED | COMMUNITY
Start: 2020-01-28 | End: 2021-09-21

## 2021-09-21 RX ORDER — METFORMIN HYDROCHLORIDE 850 MG/1
850 TABLET, COATED ORAL DAILY
Qty: 90 | Refills: 0 | Status: DISCONTINUED | COMMUNITY
Start: 2021-06-02 | End: 2021-09-21

## 2021-09-21 RX ORDER — BENZONATATE 200 MG/1
200 CAPSULE ORAL 3 TIMES DAILY
Qty: 20 | Refills: 1 | Status: DISCONTINUED | COMMUNITY
Start: 2020-02-14 | End: 2021-09-21

## 2021-11-08 NOTE — HISTORY OF PRESENT ILLNESS
[FreeTextEntry1] : Ms. Lockwood is a 81 year-old woman with a history of hiatal hernia presenting to establish care with me for osteoporosis and elevated hemoglobin A1c. She is a former patient of Michael Morfin, Mony Ferrer, and Donnie Cano. \par \par Bone History\par Menopause: Age 51\par Osteoporosis diagnosed many years ago (no report available); most recent bone density as below\par Fracture history: None\par Family history: No parental history of hip fracture\par Treatment: \par Hormone replacement therapy from 1992 to 1994\par Raloxifene 60 mg daily from 1995 to 2012\par Ibandronate 3 mg IV from 2010 to March 2012 and March 2014 to July 2016\par Denosumab 60 mg SC in August 2019, February 2020, August 2020, March 2021, September 2021\par \par Falls: No\par Height loss: About an inch\par Kidney stones: No\par Dental health: Regular appointments, no history of implants, no upcoming procedures planned\par Exercise: Walks, recumbent bicycle a few times a week,  twice a week\par Dairy intake: 1-2 servings daily (glass of milk, cottage cheese a few days a week)\par Calcium supplements: None\par Multivitamin: Centrum Silver Women 50+ (calcium 220 mg, vitamin D 1000 intl units)\par Vitamin D supplements: 2000 intl units daily\par \par Osteoporosis risk factors include: Postmenopausal status,  race, prior fracture, falls, height loss, small thin bones, tobacco use, excessive alcohol, anorexia, family history, vitamin D deficiency, corticosteroid use, seizure medications, malabsorption, hyperparathyroidism, hyperthyroidism.\par NEGATIVE EXCEPT: Postmenopausal status,  race\par \par Elevated hemoglobin A1c.\par She has a longstanding history of elevated hemoglobin A1c, up to 6.5% on two occasions. Father and brother with history of type 2 diabetes mellitus. \par She did not tolerate immediate release metformin. \par 24 hour diet recall: breakfast, eggs, orange and lemon iced tea without sugar, nonfat milk; lunch, chicken salad, sturgeon, lemon juice, grapes; dinner, honey glazed spare ribs; dessert, sweets (has almost cut out) \par Exercise: Walks, recumbent bicycle a few times a week,  twice a week

## 2021-11-08 NOTE — ADDENDUM
[FreeTextEntry1] : Recent bone density as below. Bone density demonstrated significant improvements at the lumbar spine and hip from previous; the decline at the wrist may be due to positioning. I left a telephone message for Ms. Lockwood to discuss. 11/04/21\par \par Most recent bone mineral density\par Date: November 2, 2021\par Source: Hologic\par Site: [facility]\par \par Site	BMD (g/cm2)	T-score	Change previous	Change baseline	\par Lumbar spine	1.013	-0.3         +3.8%* from previous in 2020\par Femoral neck	0.592	-2.3	\par Total hip	                0.753       -1.6         +6.2%* from previous\par Distal radius           	0.522       -2.9         -4.9%* from previous\par DXA Comments: \par Vertebral fracture assessment without evidence of compression fractures.

## 2021-11-08 NOTE — PHYSICAL EXAM
[Alert] : alert [Healthy Appearance] : healthy appearance [No Acute Distress] : no acute distress [Normal Sclera/Conjunctiva] : normal sclera/conjunctiva [Normal Hearing] : hearing was normal [No Neck Mass] : no neck mass was observed [No LAD] : no lymphadenopathy [Supple] : the neck was supple [Thyroid Not Enlarged] : the thyroid was not enlarged [No Respiratory Distress] : no respiratory distress [Clear to Auscultation] : lungs were clear to auscultation bilaterally [Normal S1, S2] : normal S1 and S2 [Normal Rate] : heart rate was normal [Regular Rhythm] : with a regular rhythm [No Spinal Tenderness] : no spinal tenderness [No Stigmata of Cushings Syndrome] : no stigmata of Cushings Syndrome [Normal Gait] : normal gait [Normal Insight/Judgement] : insight and judgment were intact [Kyphosis] : no kyphosis present [Scoliosis] : no scoliosis [Acanthosis Nigricans] : no acanthosis nigricans [de-identified] : no moon facies, no supraclavicular fat pads [de-identified] : no difficulty balancing on one leg bilaterally

## 2021-11-08 NOTE — ASSESSMENT
[FreeTextEntry1] : Osteoporosis. She has no history of fragility fracture. She has a history of prior osteoporosis therapy as above, most recently denosumab from August 2019 to present. Her most recent bone density was overall unchanged from previous. We discussed the data for anti-fracture benefits of antiresorptive therapy as long as bone density is not declining. Metabolic evaluation for secondary causes of bone loss previously unremarkable. We discussed the potential benefits and risks of antiresorptive osteoporosis therapy at length, including but not limited to osteonecrosis of the jaw and atypical femoral fracture. She is tolerating denosumab and we will continue. We discussed that denosumab must be dosed every 6 months due to rebound increase in bone breakdown with abrupt discontinuation of therapy, with transition to bisphosphonate therapy prior to a "drug holiday."\par Interval bone density testing\par Continue denosumab 60 mg SC every 6 months; next dose due in March 2022\par Calcium 1200 mg daily from diet and supplements (to be taken in divided doses as no more than 500-600 mg can be absorbed at one time)\par Decrease vitamin D supplementation to 1000 intl units daily\par Diet, exercise and fall prevention discussed\par \par Elevated hemoglobin A1c. We reviewed lifestyle modifications for glycemic control. We can consider a trial of extended release metformin next visit as needed.\par \par I reviewed the DXA performed on October 29, 2020 with the patient today.\par I reviewed the laboratories performed on July 22, 2021 with the patient today. \par I counseled the patient regarding calcium and vitamin D intake today.\par I discussed the following osteoporosis therapies: Denosumab

## 2021-11-09 NOTE — ED PROVIDER NOTE - NSICDXPASTMEDICALHX_GEN_ALL_CORE_FT
Pain Clinic Assessment:
 
1. History of Osteoarthritis:
NECK
KNEES
BACK
   History of Rheumatoid Arthritis:
Not Applicable
 
2. Height: 5 ft. 6 in. 167.6 cm.
   Weight: 282.0 lb.  oz. 127.915 kg.
   Patient's BMI: 45.5
 
3. Vital Signs:
   BP: 146/85 Pulse: 94 Resp: 18
   Temp:  02 Sat: 96 ECG Mon:
 
4. Pain Intensity: 10
 
5. Fall Risk:
   Dizziness: N  Needs help standing or walking: N
   Fallen in the last 3 months: N
   Fall risk comments:
 
 
6. Patient on Blood Thinner: None
 
7. History of Hypertension: Y
 
8. Opioid Therapy greater than 6 weeks: Y
   Opiate Contract Signed:
 
9. Risk Assessment Tool Provided: LOW RISK 0
 
10. Functional Assessment Tool: 22/70
 
11. Recreational Drug Use: Never Drug Type:
    Tobacco Use: Never Smoker Tobacco Type:
       Amount or Packs/day:  How Many Years:
    Alcohol Use: No  Frequency:  Quant: PAST MEDICAL HISTORY:  GERD (gastroesophageal reflux disease)     HLD (hyperlipidemia)     HTN (hypertension)

## 2021-12-15 RX ORDER — FAMOTIDINE 40 MG/1
40 TABLET, FILM COATED ORAL
Qty: 90 | Refills: 3 | Status: ACTIVE | COMMUNITY
Start: 2021-12-15 | End: 1900-01-01

## 2022-01-04 ENCOUNTER — RX RENEWAL (OUTPATIENT)
Age: 82
End: 2022-01-04

## 2022-01-07 ENCOUNTER — NON-APPOINTMENT (OUTPATIENT)
Age: 82
End: 2022-01-07

## 2022-01-07 ENCOUNTER — APPOINTMENT (OUTPATIENT)
Dept: INTERNAL MEDICINE | Facility: CLINIC | Age: 82
End: 2022-01-07
Payer: MEDICARE

## 2022-01-07 VITALS
HEART RATE: 78 BPM | WEIGHT: 126 LBS | TEMPERATURE: 97.8 F | OXYGEN SATURATION: 96 % | SYSTOLIC BLOOD PRESSURE: 95 MMHG | BODY MASS INDEX: 26.45 KG/M2 | DIASTOLIC BLOOD PRESSURE: 68 MMHG | HEIGHT: 58 IN

## 2022-01-07 DIAGNOSIS — Z01.818 ENCOUNTER FOR OTHER PREPROCEDURAL EXAMINATION: ICD-10-CM

## 2022-01-07 DIAGNOSIS — K21.9 GASTRO-ESOPHAGEAL REFLUX DISEASE W/OUT ESOPHAGITIS: ICD-10-CM

## 2022-01-07 PROCEDURE — 99213 OFFICE O/P EST LOW 20 MIN: CPT | Mod: 25

## 2022-01-07 PROCEDURE — 93000 ELECTROCARDIOGRAM COMPLETE: CPT

## 2022-01-07 NOTE — HISTORY OF PRESENT ILLNESS
[No Pertinent Cardiac History] : no history of aortic stenosis, atrial fibrillation, coronary artery disease, recent myocardial infarction, or implantable device/pacemaker [No Pertinent Pulmonary History] : no history of asthma, COPD, sleep apnea, or smoking [Chronic Anticoagulation] : no chronic anticoagulation [Chronic Kidney Disease] : no chronic kidney disease [Diabetes] : diabetes [FreeTextEntry1] : Cataract [FreeTextEntry2] : 01/20/2022 [FreeTextEntry3] : Dr Garcia [FreeTextEntry4] : No other new problems;\par Medication without change

## 2022-01-10 LAB
25(OH)D3 SERPL-MCNC: 81.5 NG/ML
ALBUMIN SERPL ELPH-MCNC: 4.4 G/DL
ALP BLD-CCNC: 57 U/L
ALT SERPL-CCNC: 12 U/L
ANION GAP SERPL CALC-SCNC: 13 MMOL/L
APPEARANCE: CLEAR
APTT BLD: 27.9 SEC
AST SERPL-CCNC: 19 U/L
BACTERIA: NEGATIVE
BASOPHILS # BLD AUTO: 0.06 K/UL
BASOPHILS NFR BLD AUTO: 0.8 %
BILIRUB SERPL-MCNC: 0.8 MG/DL
BILIRUBIN URINE: NEGATIVE
BLOOD URINE: NEGATIVE
BUN SERPL-MCNC: 34 MG/DL
CALCIUM SERPL-MCNC: 9.6 MG/DL
CHLORIDE SERPL-SCNC: 101 MMOL/L
CHOLEST SERPL-MCNC: 202 MG/DL
CO2 SERPL-SCNC: 23 MMOL/L
COLOR: YELLOW
CREAT SERPL-MCNC: 1 MG/DL
EOSINOPHIL # BLD AUTO: 0.16 K/UL
EOSINOPHIL NFR BLD AUTO: 2.2 %
ESTIMATED AVERAGE GLUCOSE: 131 MG/DL
GLUCOSE QUALITATIVE U: NEGATIVE
GLUCOSE SERPL-MCNC: 106 MG/DL
HBA1C MFR BLD HPLC: 6.2 %
HCT VFR BLD CALC: 40.5 %
HDLC SERPL-MCNC: 74 MG/DL
HGB BLD-MCNC: 13 G/DL
HYALINE CASTS: 0 /LPF
IMM GRANULOCYTES NFR BLD AUTO: 0.3 %
INR PPP: 0.91 RATIO
KETONES URINE: NEGATIVE
LDLC SERPL CALC-MCNC: 114 MG/DL
LEUKOCYTE ESTERASE URINE: NEGATIVE
LYMPHOCYTES # BLD AUTO: 1.95 K/UL
LYMPHOCYTES NFR BLD AUTO: 26.3 %
MAN DIFF?: NORMAL
MCHC RBC-ENTMCNC: 30.4 PG
MCHC RBC-ENTMCNC: 32.1 GM/DL
MCV RBC AUTO: 94.8 FL
MICROSCOPIC-UA: NORMAL
MONOCYTES # BLD AUTO: 0.64 K/UL
MONOCYTES NFR BLD AUTO: 8.6 %
NEUTROPHILS # BLD AUTO: 4.58 K/UL
NEUTROPHILS NFR BLD AUTO: 61.8 %
NITRITE URINE: NEGATIVE
NONHDLC SERPL-MCNC: 128 MG/DL
PH URINE: 5
PLATELET # BLD AUTO: 207 K/UL
POTASSIUM SERPL-SCNC: 4.8 MMOL/L
PROT SERPL-MCNC: 6.9 G/DL
PROTEIN URINE: NEGATIVE
PT BLD: 10.8 SEC
RBC # BLD: 4.27 M/UL
RBC # FLD: 13.6 %
RED BLOOD CELLS URINE: 1 /HPF
SODIUM SERPL-SCNC: 137 MMOL/L
SPECIFIC GRAVITY URINE: 1.02
SQUAMOUS EPITHELIAL CELLS: 1 /HPF
T4 FREE SERPL-MCNC: 1.6 NG/DL
TRIGL SERPL-MCNC: 68 MG/DL
TSH SERPL-ACNC: 4.59 UIU/ML
UROBILINOGEN URINE: NORMAL
WBC # FLD AUTO: 7.41 K/UL
WHITE BLOOD CELLS URINE: 1 /HPF

## 2022-02-16 ENCOUNTER — RX RENEWAL (OUTPATIENT)
Age: 82
End: 2022-02-16

## 2022-03-15 ENCOUNTER — OUTPATIENT (OUTPATIENT)
Dept: OUTPATIENT SERVICES | Facility: HOSPITAL | Age: 82
LOS: 1 days | End: 2022-03-15
Payer: MEDICARE

## 2022-03-15 ENCOUNTER — APPOINTMENT (OUTPATIENT)
Age: 82
End: 2022-03-15

## 2022-03-15 VITALS
SYSTOLIC BLOOD PRESSURE: 91 MMHG | OXYGEN SATURATION: 96 % | HEART RATE: 72 BPM | DIASTOLIC BLOOD PRESSURE: 60 MMHG | TEMPERATURE: 97 F | RESPIRATION RATE: 18 BRPM

## 2022-03-15 DIAGNOSIS — M81.0 AGE-RELATED OSTEOPOROSIS WITHOUT CURRENT PATHOLOGICAL FRACTURE: ICD-10-CM

## 2022-03-15 PROCEDURE — 96372 THER/PROPH/DIAG INJ SC/IM: CPT

## 2022-03-15 RX ORDER — DENOSUMAB 60 MG/ML
60 INJECTION SUBCUTANEOUS ONCE
Refills: 0 | Status: COMPLETED | OUTPATIENT
Start: 2022-03-15 | End: 2022-03-15

## 2022-03-15 RX ADMIN — DENOSUMAB 60 MILLIGRAM(S): 60 INJECTION SUBCUTANEOUS at 15:10

## 2022-03-16 ENCOUNTER — APPOINTMENT (OUTPATIENT)
Dept: ENDOCRINOLOGY | Facility: CLINIC | Age: 82
End: 2022-03-16
Payer: MEDICARE

## 2022-03-16 VITALS
DIASTOLIC BLOOD PRESSURE: 58 MMHG | HEART RATE: 80 BPM | BODY MASS INDEX: 25.5 KG/M2 | SYSTOLIC BLOOD PRESSURE: 89 MMHG | WEIGHT: 122 LBS

## 2022-03-16 PROCEDURE — 99214 OFFICE O/P EST MOD 30 MIN: CPT

## 2022-03-16 NOTE — ASSESSMENT
[FreeTextEntry1] : Osteoporosis. She has no history of fragility fracture. She has a history of prior osteoporosis therapy as above, most recently denosumab from August 2019 to present. Her most recent bone density demonstrated significant improvements at the lumbar spine and hip from previous; the decline at the wrist may be due to positioning. Metabolic evaluation for secondary causes of bone loss previously unremarkable. We discussed the potential benefits and risks of antiresorptive osteoporosis therapy at length, including but not limited to osteonecrosis of the jaw and atypical femoral fracture. She is tolerating denosumab and we will plan to continue for a total of eight doses prior to transition to bisphosphonate therapy. We discussed that denosumab must be dosed every 6 months due to rebound increase in bone breakdown with abrupt discontinuation of therapy, with transition to bisphosphonate therapy prior to a "drug holiday."\par Continue denosumab 60 mg SC every 6 months; next dose due in September 2022 and she prefers in our clinic\par Calcium 1200 mg daily from diet and supplements (to be taken in divided doses as no more than 500-600 mg can be absorbed at one time)\par Decrease vitamin D supplementation to 1000 intl units daily\par Diet, exercise and fall prevention discussed\par \par Elevated hemoglobin A1c. We reviewed lifestyle modifications for glycemic control. We can consider a trial of extended release metformin next visit as needed.\par \par Elevated thyroid stimulating hormone. TSH may be elevated due to nonthyroidal illness or subclinical hypothyroidism. She is clinically euthyroid. We can send a thyroid function panel, thyroid peroxidase, and thyroglobulin antibodies next visit. There is no indication to start levothyroxine unless TSH rises above 10 uIU/mL. \par \par I reviewed the DXA performed on November 2, 2021 with the patient today.\par I reviewed the laboratories performed on January 7, 2022 with the patient today. \par I counseled the patient regarding calcium and vitamin D intake today.\par I discussed the following osteoporosis therapies: Denosumab

## 2022-03-16 NOTE — DATA REVIEWED
[FreeTextEntry1] : Most recent bone mineral density\par Date: November 2, 2021\par Source: Hologic\par Site: [facility]\par \par Site	BMD (g/cm2)	T-score	Change previous	Change baseline	\par Lumbar spine	1.013	-0.3 +3.8%* from previous in 2020\par Femoral neck	0.592	-2.3	\par Total hip	 0.753 -1.6 +6.2%* from previous\par Distal radius 	0.522 -2.9 -4.9%* from previous\par DXA Comments: \par Vertebral fracture assessment without evidence of compression fractures.

## 2022-03-16 NOTE — HISTORY OF PRESENT ILLNESS
[FreeTextEntry1] : Ms. Lockwood is a 81 year-old woman with a history of hiatal hernia presenting for follow-up of osteoporosis and elevated hemoglobin A1c. I saw her for an initial visit in September 2021; she is a former patient of Michael Morfin, Mony Ferrer, and Donnie Cano. \par \par Bone History\par Menopause: Age 51\par Osteoporosis diagnosed many years ago (no report available); most recent bone density as below\par Fracture history: None\par Family history: No parental history of hip fracture\par Treatment: \par Hormone replacement therapy from 1992 to 1994\par Raloxifene 60 mg daily from 1995 to 2012\par Ibandronate 3 mg IV from 2010 to March 2012 and March 2014 to July 2016\par Denosumab 60 mg SC in August 2019, February 2020, August 2020, March 2021, September 2021, March 2022\par \par Falls: No\par Height loss: About an inch\par Kidney stones: No\par Dental health: Regular appointments, no history of implants, no upcoming procedures planned\par Exercise: Walks, recumbent bicycle a few times a week,  twice a week\par Dairy intake: 1-2 servings daily (glass of milk, cottage cheese a few days a week)\par Calcium supplements: None\par Multivitamin: Centrum Silver Women 50+ (calcium 220 mg, vitamin D 1000 intl units)\par Vitamin D supplements: 2000 intl units daily\par \par Osteoporosis risk factors include: Postmenopausal status,  race, prior fracture, falls, height loss, small thin bones, tobacco use, excessive alcohol, anorexia, family history, vitamin D deficiency, corticosteroid use, seizure medications, malabsorption, hyperparathyroidism, hyperthyroidism.\par NEGATIVE EXCEPT: Postmenopausal status,  race\par \par Elevated hemoglobin A1c.\par She has a longstanding history of elevated hemoglobin A1c, up to 6.5% on two occasions. Father and brother with history of type 2 diabetes mellitus. \par She did not tolerate immediate release metformin. \par 24 hour diet recall from initial visit: breakfast, eggs, orange and lemon iced tea without sugar, nonfat milk; lunch, chicken salad, sturgeon, lemon juice, grapes; dinner, honey glazed spare ribs; dessert, sweets (has almost cut out) \par Exercise: Walks, recumbent bicycle a few times a week,  twice a week\par \par Interim History \par She is status post cataract surgery. \par She has cut out sweets and has lost 12 pounds from 136 pounds in February 2021.\par She has seen Dr. Ely Baca; note reviewed. Recent laboratory results reviewed. HbA1c 6.2%. TSH 4.59 uIU/mL (normal: 0.27-4.20).\par Her most recent bone density demonstrated significant improvements at the lumbar spine and hip from previous; the decline at the wrist may be due to positioning. \par Medical and surgical history, medications, allergies, social and family history reviewed and updated as needed.

## 2022-03-16 NOTE — PHYSICAL EXAM
[Alert] : alert [Healthy Appearance] : healthy appearance [No Acute Distress] : no acute distress [Normal Sclera/Conjunctiva] : normal sclera/conjunctiva [Normal Hearing] : hearing was normal [No Respiratory Distress] : no respiratory distress [No Spinal Tenderness] : no spinal tenderness [No Stigmata of Cushings Syndrome] : no stigmata of Cushings Syndrome [Normal Gait] : normal gait [Normal Insight/Judgement] : insight and judgment were intact [Kyphosis] : no kyphosis present [Scoliosis] : no scoliosis [Acanthosis Nigricans] : no acanthosis nigricans [de-identified] : no moon facies, no supraclavicular fat pads [de-identified] : no difficulty balancing on one leg bilaterally

## 2022-03-29 NOTE — ED ADULT NURSE NOTE - IN THE PAST 12 MONTHS HAVE YOU USED DRUGS OTHER THAN THOSE REQUIRED FOR MEDICAL REASON?
Assessment and Plan:       Patient presents with her son and daughter today for her annual wellness Medicare questionnaire  1  Immunizations reviewed  2  She was recently hospitalized, see today's office note  Hospitalization, consultations, meds, imaging, labs reviewed, and reviewed with patient today as well  3  Patient is now a resident at [facility] which is an assisted living [facility]  She is very happy there  4  Advanced directives are completed, son and daughter are aware of her wishes  5  Patient was started on antidepressant at her last visit and she notes feeling much better and having a much more positive outlook  6  For full evaluation of chronic medical conditions and recent hospitalization, please see today's office note  Problem List Items Addressed This Visit        Endocrine    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)       Nervous and Auditory    Peripheral neuropathy       Genitourinary    Acute kidney injury superimposed on CKD ([facility] [address] )       Other    Reactive depression      Other Visit Diagnoses     Fall, subsequent encounter    -  Primary    Right ankle swelling              Falls Plan of Care: balance, strength, and gait training instructions were provided  Patient was recently discharged from physical therapy and inpatient rehab  She continues with strengthening exercises at home  Preventive health issues were discussed with patient, and age appropriate screening tests were ordered as noted in patient's After Visit Summary  Personalized health advice and appropriate referrals for health education or preventive services given if needed, as noted in patient's After Visit Summary       History of Present Illness:     Patient presents for Medicare Annual Wellness visit    Patient Care Team:  Anne Wu MD as PCP - General (Family Medicine)  Anne Wu MD as PCP - PCP-[facility] Attributed-Roster     Problem List:     Patient Active Problem List   Diagnosis    Vitamin D insufficiency    Retinal hole of right eye    Peripheral neuropathy    Essential hypertension    Pure hypercholesterolemia    Gout    Gait disturbance    Ectropion    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)    Acquired cyst of kidney    Chronic kidney disease, stage IV (severe) ([facility])    Borderline glaucoma    Bilateral deafness    Atrophic kidney, acquired    Allergic rhinitis    Vertebral anomaly    Acute cystitis without hematuria    Bowel habit changes    Rectal discharge    Generalized edema    Fecal smearing    Type 2 diabetes mellitus with diabetic chronic kidney disease (HCC)    Secondary hyperparathyroidism of renal origin ([facility] )    Hypertensive chronic kidney disease    Elevated uric acid in blood    Urinary retention    Acute kidney injury superimposed on CKD (HCC)    Anemia in stage 4 chronic kidney disease (HCC)    Hyperkalemia    Adult failure to thrive    Fall at home, initial encounter    Moderate protein-calorie malnutrition (HCC)    Leukocytosis    Right ankle pain    Reactive depression    Orthostatic hypotension      Past Medical and Surgical History:     Past Medical History:   Diagnosis Date    Diabetes mellitus (HCC)     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 ([facility] )     LAST ASSESSED: [id number]    Hyperlipidemia     Hypertension     Renal disorder     Squamous cell carcinoma, scalp/neck     LAST ASSESSED: [id number]     Past Surgical History:   Procedure Laterality Date    BLADDER SURGERY      LAST ASSESSED: [id number]    PELVIC FLOOR REPAIR      VAGINAL SURG INSERTION OF MESH    TOTAL ABDOMINAL HYSTERECTOMY W/ BILATERAL SALPINGOOPHORECTOMY      LAST ASSESSED: [id number]      Family History:     Family History   Problem Relation Age of Onset    Hypertension Mother     Hypertension Father     Kidney disease Sister         CHRONIC    Alcohol abuse Family     Substance Abuse Family       Social History:     Social History     Socioeconomic History    Marital status:      Spouse name: None    Number of children: None    Years of education: None    Highest education level: None   Occupational History    None   Tobacco Use    Smoking status: Former Smoker    Smokeless tobacco: Never Used   Vaping Use    Vaping Use: Never used   Substance and Sexual Activity    Alcohol use: Yes     Comment: SOCIAL    Drug use: No    Sexual activity: None   Other Topics Concern    None   Social History Narrative    DAILY CAFFEINE CONSUMPTION, 1 SERVING A DAY    LIVES INDEPENDENTLY     Social Determinants of Health     Financial Resource Strain: Not on file   Food Insecurity: Not on file   Transportation Needs: Unknown    Lack of Transportation (Medical): No    Lack of Transportation (Non-Medical):  Not on file   Physical Activity: Not on file   Stress: Not on file   Social Connections: Not on file   Intimate Partner Violence: Not on file   Housing Stability: Unknown    Unable to Pay for Housing in the Last Year: Not on file    Number of Places Lived in the Last Year: Not on file    Unstable Housing in the Last Year: No      Medications and Allergies:     Current Outpatient Medications   Medication Sig Dispense Refill    aspirin 81 MG tablet Take 1 tablet by mouth daily      cinacalcet (SENSIPAR) 30 mg tablet Take 1 tablet (30 mg total) by mouth 3 (three) times a week 36 tablet 3    citalopram (CeleXA) 10 mg tablet TAKE 1 TABLET BY MOUTH EVERY DAY 30 tablet 1    Contour Test test strip TEST AS DIRECTED 100 each 3    gabapentin (NEURONTIN) 100 mg capsule Take 100 mg by mouth in the morning      gabapentin (NEURONTIN) 400 mg capsule Take 400 mg by mouth daily at bedtime      Lancets (ONETOUCH ULTRASOFT) lancets by Does not apply route 2 (two) times a day      mirtazapine (REMERON) 7 5 MG tablet Take 7 5 mg by mouth daily at bedtime      simvastatin (ZOCOR) 20 mg tablet TAKE 1 TABLET BY MOUTH EVERY DAY 90 tablet 1    allopurinol (ZYLOPRIM) 100 mg tablet Take 50 mg by mouth daily      docusate sodium (COLACE) 100 mg capsule Take 100 mg by mouth daily (Patient not taking: Reported on 3/29/2022 )      furosemide (LASIX) 40 mg tablet Take 40 mg by mouth in the morning (Patient not taking: Reported on 3/29/2022 )      Premarin vaginal cream  (Patient not taking: Reported on 3/29/2022 )       No current facility-administered medications for this visit  Allergies   Allergen Reactions    Influenza Virus Vaccine      Other reaction(s): HX of Guillain-[place] Syndrome    Sulfa Antibiotics       Immunizations: There is no immunization history for the selected administration types on file for this patient  Health Maintenance: There are no preventive care reminders to display for this patient  Topic Date Due    COVID-19 Vaccine (1) Never done    Pneumococcal Vaccine: 65+ Years (1 of 2 - PPSV23) Never done    DTaP,Tdap,and Td Vaccines (1 - Tdap) Never done    Influenza Vaccine (1) Never done      Medicare Health Risk Assessment:     /68   Pulse 64   Ht 5' (1 524 m)   Wt 61 7 kg (136 lb)   LMP  (LMP Unknown)   BMI 26 56 kg/m²      Sara Mendez is here for her Subsequent Wellness visit  Health Risk Assessment:   Patient rates overall health as fair  Patient feels that their physical health rating is slightly worse  Patient is satisfied with their life  Eyesight was rated as slightly worse  Hearing was rated as slightly worse  Patient feels that their emotional and mental health rating is slightly better  Patients states they are never, rarely angry  Patient states they are never, rarely unusually tired/fatigued  Pain experienced in the last 7 days has been none  Patient states that she has experienced no weight loss or gain in last 6 months  Feeling better since starting Citalopram at last visit  Depression Screening:   PHQ-9 Score: 6      Fall Risk Screening:    In the past year, patient has experienced: history of falling in past year    Number of falls: 2 or more  Injured during fall?: Yes    Feels unsteady when standing or walking?: Yes    Worried about falling?: Yes      Urinary Incontinence Screening:   Patient has leaked urine accidently in the last six months  Home Safety:  Patient has trouble with stairs inside or outside of their home  Patient has working smoke alarms and has working carbon monoxide detector  Home safety hazards include: none  Patient now lives at had a [facility] living [facility]  Her medication is given to her by nursing staff, she uses a walker now for added stability  Her meals are prepared for her  She is happy and content with her current situation  Her    Nutrition:   Current diet is Regular, No Added Salt and Low Carb  Medications:   Patient is currently taking over-the-counter supplements  OTC medications include: see medication list  Patient is not able to manage medications  Activities of Daily Living (ADLs)/Instrumental Activities of Daily Living (IADLs):   Walk and transfer into and out of bed and chair?: Yes  Dress and groom yourself?: Yes    Bathe or shower yourself?: Yes    Feed yourself? Yes  Do your laundry/housekeeping?: No  Manage your money, pay your bills and track your expenses?: No  Make your own meals?: No    Do your own shopping?: No    ADL comments: Pt is now living in an assisted living community  See above  Previous Hospitalizations:   Any hospitalizations or ED visits within the last 12 months?: Yes    How many hospitalizations have you had in the last year?: 1-2    Hospitalization Comments: Patient hospitalized earlier this month for weakness, fall, UTI  She has had a slow but gradual and progressive weakness and decline and feels more comfortable with the added care that she receives now in her assisted living community  For full evaluation, please see today's office note      Advance Care Planning:   Living will: Yes    Durable POA for healthcare: Yes    Advanced directive: Yes      PREVENTIVE SCREENINGS      Cardiovascular Screening:    General: Screening Not Indicated and History Lipid Disorder      Diabetes Screening:     General: Screening Not Indicated and History Diabetes      Colorectal Cancer Screening:     General: Screening Not Indicated      Breast Cancer Screening:     General: Screening Not Indicated      Cervical Cancer Screening:    General: Screening Not Indicated      Osteoporosis Screening:    General: Screening Not Indicated      Abdominal Aortic Aneurysm (AAA) Screening:        General: Screening Not Indicated      Lung Cancer Screening:     General: Screening Not Indicated      Hepatitis C Screening:    General: Screening Not Indicated    Screening, Brief Intervention, and Referral to Treatment (SBIRT)    Screening  Typical number of drinks in a day: 0  Typical number of drinks in a week: 0  Interpretation: Low risk drinking behavior      Single Item Drug Screening:  How often have you used an illegal drug (including marijuana) or a prescription medication for non-medical reasons in the past year? never    Single Item Drug Screen Score: 0  Interpretation: Negative screen for possible drug use disorder      Nithin Samano MD No

## 2022-03-30 ENCOUNTER — RX RENEWAL (OUTPATIENT)
Age: 82
End: 2022-03-30

## 2022-05-04 DIAGNOSIS — U07.1 COVID-19: ICD-10-CM

## 2022-07-05 ENCOUNTER — RX RENEWAL (OUTPATIENT)
Age: 82
End: 2022-07-05

## 2022-08-17 ENCOUNTER — RX RENEWAL (OUTPATIENT)
Age: 82
End: 2022-08-17

## 2022-09-13 ENCOUNTER — APPOINTMENT (OUTPATIENT)
Age: 82
End: 2022-09-13

## 2022-09-13 ENCOUNTER — APPOINTMENT (OUTPATIENT)
Dept: INFUSION THERAPY | Facility: CLINIC | Age: 82
End: 2022-09-13

## 2022-09-15 ENCOUNTER — RX RENEWAL (OUTPATIENT)
Age: 82
End: 2022-09-15

## 2022-09-21 ENCOUNTER — APPOINTMENT (OUTPATIENT)
Dept: ENDOCRINOLOGY | Facility: CLINIC | Age: 82
End: 2022-09-21

## 2022-09-21 VITALS
BODY MASS INDEX: 25.29 KG/M2 | HEART RATE: 73 BPM | WEIGHT: 121 LBS | DIASTOLIC BLOOD PRESSURE: 54 MMHG | SYSTOLIC BLOOD PRESSURE: 88 MMHG

## 2022-09-21 DIAGNOSIS — R73.03 PREDIABETES.: ICD-10-CM

## 2022-09-21 DIAGNOSIS — R94.6 ABNORMAL RESULTS OF THYROID FUNCTION STUDIES: ICD-10-CM

## 2022-09-21 PROCEDURE — 96372 THER/PROPH/DIAG INJ SC/IM: CPT

## 2022-09-21 PROCEDURE — 99214 OFFICE O/P EST MOD 30 MIN: CPT | Mod: 25

## 2022-09-21 RX ORDER — NIRMATRELVIR AND RITONAVIR 300-100 MG
20 X 150 MG & KIT ORAL
Qty: 30 | Refills: 0 | Status: DISCONTINUED | COMMUNITY
Start: 2022-05-04 | End: 2022-09-21

## 2022-09-21 RX ORDER — DENOSUMAB 60 MG/ML
60 INJECTION SUBCUTANEOUS
Qty: 1 | Refills: 0 | Status: COMPLETED | OUTPATIENT
Start: 2022-09-21

## 2022-09-21 RX ADMIN — DENOSUMAB 0 MG/ML: 60 INJECTION SUBCUTANEOUS at 00:00

## 2022-09-22 LAB
ALBUMIN SERPL ELPH-MCNC: 4.4 G/DL
ALP BLD-CCNC: 56 U/L
ALT SERPL-CCNC: 12 U/L
ANION GAP SERPL CALC-SCNC: 12 MMOL/L
AST SERPL-CCNC: 18 U/L
BASOPHILS # BLD AUTO: 0.06 K/UL
BASOPHILS NFR BLD AUTO: 0.8 %
BILIRUB SERPL-MCNC: 0.7 MG/DL
BUN SERPL-MCNC: 28 MG/DL
CALCIUM SERPL-MCNC: 9.5 MG/DL
CHLORIDE SERPL-SCNC: 101 MMOL/L
CHOLEST SERPL-MCNC: 212 MG/DL
CO2 SERPL-SCNC: 24 MMOL/L
CREAT SERPL-MCNC: 0.83 MG/DL
EGFR: 71 ML/MIN/1.73M2
EOSINOPHIL # BLD AUTO: 0.18 K/UL
EOSINOPHIL NFR BLD AUTO: 2.4 %
ESTIMATED AVERAGE GLUCOSE: 131 MG/DL
GLUCOSE SERPL-MCNC: 107 MG/DL
HBA1C MFR BLD HPLC: 6.2 %
HCT VFR BLD CALC: 39.9 %
HDLC SERPL-MCNC: 82 MG/DL
HGB BLD-MCNC: 12.6 G/DL
IMM GRANULOCYTES NFR BLD AUTO: 0.3 %
LDLC SERPL CALC-MCNC: 115 MG/DL
LYMPHOCYTES # BLD AUTO: 2.16 K/UL
LYMPHOCYTES NFR BLD AUTO: 28.9 %
MAGNESIUM SERPL-MCNC: 1.8 MG/DL
MAN DIFF?: NORMAL
MCHC RBC-ENTMCNC: 31 PG
MCHC RBC-ENTMCNC: 31.6 GM/DL
MCV RBC AUTO: 98.3 FL
MONOCYTES # BLD AUTO: 0.71 K/UL
MONOCYTES NFR BLD AUTO: 9.5 %
NEUTROPHILS # BLD AUTO: 4.34 K/UL
NEUTROPHILS NFR BLD AUTO: 58.1 %
NONHDLC SERPL-MCNC: 130 MG/DL
PHOSPHATE SERPL-MCNC: 3.4 MG/DL
PLATELET # BLD AUTO: 237 K/UL
POTASSIUM SERPL-SCNC: 4.6 MMOL/L
PROT SERPL-MCNC: 6.6 G/DL
RBC # BLD: 4.06 M/UL
RBC # FLD: 13.7 %
SODIUM SERPL-SCNC: 138 MMOL/L
T3 SERPL-MCNC: 103 NG/DL
T4 FREE SERPL-MCNC: 1.6 NG/DL
T4 SERPL-MCNC: 9.7 UG/DL
THYROGLOB AB SERPL-ACNC: <20 IU/ML
THYROPEROXIDASE AB SERPL IA-ACNC: <10 IU/ML
TRIGL SERPL-MCNC: 74 MG/DL
TSH SERPL-ACNC: 3.34 UIU/ML
WBC # FLD AUTO: 7.47 K/UL

## 2022-09-22 NOTE — ADDENDUM
[FreeTextEntry1] : Procedure Note: Denosumab 60 mg SC administered into right deltoid area. Aliya Bhatti MD\par \par Recent laboratory results as below; discussed with Ms. Lockwood. Blood urea nitrogen elevated and recommended hydration. HbA1c 6.2% and recommended continued lifestyle modifications.  mg/dL on atorvastatin; recommended follow-up with primary care. TSH, T4/T3 within range with negative antibodies. Other test results within range. 9/22/22

## 2022-09-22 NOTE — ASSESSMENT
[FreeTextEntry1] : Osteoporosis. She has no history of fragility fracture. She has a history of prior osteoporosis therapy as above, and most recently denosumab from August 2019 to present. Her most recent bone density demonstrated significant improvements at the lumbar spine and hip from previous; the decline at the wrist may be due to positioning. Metabolic evaluation for secondary causes of bone loss previously unremarkable. We discussed the potential benefits and risks of antiresorptive osteoporosis therapy, including but not limited to osteonecrosis of the jaw and atypical femoral fracture. She is tolerating denosumab and we will plan to continue. We discussed that denosumab must be dosed every 6 months due to rebound increase in bone breakdown with abrupt discontinuation of therapy, with transition to bisphosphonate therapy prior to a "drug holiday."\par Continue denosumab 60 mg SC every 6 months; dosed today\par Calcium 1200 mg daily from diet and supplements (to be taken in divided doses as no more than 500-600 mg can be absorbed at one time)\par Continue current vitamin D regimen\par Diet, exercise and fall prevention discussed\par \par Elevated hemoglobin A1c. We reviewed lifestyle modifications for glycemic control. We will monitor hemoglobin A1c. \par \par Elevated thyroid stimulating hormone. TSH may be elevated due to nonthyroidal illness or subclinical hypothyroidism. She is clinically euthyroid. We can send a thyroid function panel, thyroid peroxidase, and thyroglobulin antibodies. There is no indication to start levothyroxine unless TSH rises above 10 uIU/mL. \par \par Hyperlipidemia. She requests monitoring of her lipid panel. \par \par I reviewed the DXA performed on November 2, 2021 with the patient today.\par I reviewed the laboratories performed on January 7, 2022 with the patient today. \par I counseled the patient regarding calcium and vitamin D intake today.\par I discussed the following osteoporosis therapies: Denosumab

## 2022-09-22 NOTE — PHYSICAL EXAM
[Alert] : alert [Healthy Appearance] : healthy appearance [No Acute Distress] : no acute distress [Normal Sclera/Conjunctiva] : normal sclera/conjunctiva [Normal Hearing] : hearing was normal [No Respiratory Distress] : no respiratory distress [No Spinal Tenderness] : no spinal tenderness [No Stigmata of Cushings Syndrome] : no stigmata of Cushings Syndrome [Normal Gait] : normal gait [Normal Insight/Judgement] : insight and judgment were intact [Kyphosis] : no kyphosis present [Scoliosis] : no scoliosis [Acanthosis Nigricans] : no acanthosis nigricans [de-identified] : no moon facies, no supraclavicular fat pads

## 2022-09-22 NOTE — DATA REVIEWED
[FreeTextEntry1] : Most recent bone mineral density\par Date: November 2, 2021\par Source: Hologic\par Site: [facility]\par \par Site	BMD (g/cm2)	T-score	Change previous	Change baseline	\par Lumbar spine	1.013	-0.3 +3.8%* from previous in 2020\par Femoral neck	0.592	-2.3	\par Total hip	                0.753       -1.6 +6.2%* from previous\par Distal radius 	0.522       -2.9 -4.9%* from previous\par DXA Comments: \par Vertebral fracture assessment without evidence of compression fractures.

## 2022-09-22 NOTE — HISTORY OF PRESENT ILLNESS
[FreeTextEntry1] : Ms. Lockwood is an 82 year-old woman with a history of hiatal hernia presenting for follow-up of osteoporosis and elevated hemoglobin A1c. I saw her for an initial visit in September 2021 and last in March 2022; she is a former patient of Michael Morfin, Mony Ferrer, and Donnie Cano. \par \par Bone History\par Menopause: Age 51\par Osteoporosis diagnosed many years ago (no report available); most recent bone density as below\par Fracture history: None\par Family history: No parental history of hip fracture\par Treatment: \par Hormone replacement therapy from 1992 to 1994\par Raloxifene 60 mg daily from 1995 to 2012\par Ibandronate 3 mg IV from 2010 to March 2012 and March 2014 to July 2016\par Denosumab 60 mg SC in August 2019, February 2020, August 2020, March 2021, September 2021, March 2022\par \par Falls: No\par Height loss: About an inch\par Kidney stones: No\par Dental health: Regular appointments, no history of implants, no upcoming procedures planned\par Exercise: Walks, recumbent bicycle a few times a week,  twice a week\par Dairy intake: 1-2 servings daily (glass of milk, cottage cheese a few days a week)\par Calcium supplements: None\par Multivitamin: Centrum Silver Women 50+ (calcium 220 mg, vitamin D 1000 intl units)\par Vitamin D supplements: 2000 intl units three times a week\par \par Osteoporosis risk factors include: Postmenopausal status,  race, prior fracture, falls, height loss, small thin bones, tobacco use, excessive alcohol, anorexia, family history, vitamin D deficiency, corticosteroid use, seizure medications, malabsorption, hyperparathyroidism, hyperthyroidism.\par NEGATIVE EXCEPT: Postmenopausal status,  race\par \par Elevated hemoglobin A1c.\par She has a longstanding history of elevated hemoglobin A1c, up to 6.5% on two occasions. Father and brother with history of type 2 diabetes mellitus. \par She did not tolerate immediate release metformin. \par She has made extensive lifestyle modifications. \par \par Interim History \par She presents today for denosumab injection. \par She was diagnosed with COVID-19 infection in March.\par She has had hoarse voice.\par She has continued lifestyle modifications and cut out sweets. \par Medical and surgical history, medications, allergies, social and family history reviewed and updated as needed.

## 2022-09-29 ENCOUNTER — RX RENEWAL (OUTPATIENT)
Age: 82
End: 2022-09-29

## 2022-12-19 ENCOUNTER — RX RENEWAL (OUTPATIENT)
Age: 82
End: 2022-12-19

## 2023-01-18 DIAGNOSIS — M79.673 PAIN IN UNSPECIFIED FOOT: ICD-10-CM

## 2023-02-15 ENCOUNTER — RX RENEWAL (OUTPATIENT)
Age: 83
End: 2023-02-15

## 2023-03-14 ENCOUNTER — RX RENEWAL (OUTPATIENT)
Age: 83
End: 2023-03-14

## 2023-03-27 ENCOUNTER — APPOINTMENT (OUTPATIENT)
Dept: HEART AND VASCULAR | Facility: CLINIC | Age: 83
End: 2023-03-27
Payer: MEDICARE

## 2023-03-27 ENCOUNTER — NON-APPOINTMENT (OUTPATIENT)
Age: 83
End: 2023-03-27

## 2023-03-27 VITALS
HEART RATE: 91 BPM | WEIGHT: 120 LBS | BODY MASS INDEX: 25.19 KG/M2 | HEIGHT: 58 IN | SYSTOLIC BLOOD PRESSURE: 102 MMHG | DIASTOLIC BLOOD PRESSURE: 66 MMHG

## 2023-03-27 DIAGNOSIS — Z82.3 FAMILY HISTORY OF STROKE: ICD-10-CM

## 2023-03-27 DIAGNOSIS — Z82.49 FAMILY HISTORY OF ISCHEMIC HEART DISEASE AND OTHER DISEASES OF THE CIRCULATORY SYSTEM: ICD-10-CM

## 2023-03-27 DIAGNOSIS — E78.2 MIXED HYPERLIPIDEMIA: ICD-10-CM

## 2023-03-27 DIAGNOSIS — Z13.6 ENCOUNTER FOR SCREENING FOR CARDIOVASCULAR DISORDERS: ICD-10-CM

## 2023-03-27 DIAGNOSIS — I10 ESSENTIAL (PRIMARY) HYPERTENSION: ICD-10-CM

## 2023-03-27 DIAGNOSIS — Z87.891 PERSONAL HISTORY OF NICOTINE DEPENDENCE: ICD-10-CM

## 2023-03-27 PROCEDURE — 93000 ELECTROCARDIOGRAM COMPLETE: CPT

## 2023-03-27 PROCEDURE — 99204 OFFICE O/P NEW MOD 45 MIN: CPT | Mod: 25

## 2023-03-27 RX ORDER — FAMOTIDINE 10 MG/1
TABLET, FILM COATED ORAL
Refills: 0 | Status: DISCONTINUED | COMMUNITY
End: 2023-03-27

## 2023-03-27 NOTE — REASON FOR VISIT
[FreeTextEntry1] : Pt. is an 82 year old F with PMHx of HTN, HLD and pre- DM who presents today to establish cardiac care.

## 2023-03-27 NOTE — PHYSICAL EXAM
[Normal Conjunctiva] : normal conjunctiva [Normal Venous Pressure] : normal venous pressure [No Carotid Bruit] : no carotid bruit [Normal S1, S2] : normal S1, S2 [No Murmur] : no murmur [Normal Gait] : normal gait [No Edema] : no edema [Normal PT B/L] : normal PT B/L [Normal] : moves all extremities, no focal deficits, normal speech [Soft] : abdomen soft [Non Tender] : non-tender [No Rash] : no rash [Alert and Oriented] : alert and oriented

## 2023-03-27 NOTE — HISTORY OF PRESENT ILLNESS
[FreeTextEntry1] : Self-referred\par PCP: Ely Baca\par Last saw a Cardiologist in \par \par Patient presents today to establish cardiac care. She does not have any acute issues/concerns but would like a cardiac workup. \par \par She exercises with a virtual  twice/week. She uses a recumbent bike for 30 minutes and does light weight lifting. She usually walks 0.75 miles 4-5 times per week, but finds her walking is limited by left foot pain. No exertional chest pain or shortness of breath. \par \par She gave up simple sugars since being diagnosed with pre- DM. She cooks mostly for herself but orders takeout 1-2 times/week. She eats a lot of eggs but not enough vegetables. \par \par She has been on Atorvastatin 20 mg daily for many years. Her last LDL was 115mg% (2022).\par \par She reports decreased energy since turning 77. \par \par Pt. denies any orthopnea, PND, palpitations, lightheadedness or syncope. She admits to some bilateral ankle swelling only when traveling long distances.\par \par =========================\par Labs/Diagnostics: \par 2022\par Lipid profile: T, TC: 212, HDL: 82, LDL: 115\par A1c: 6.2%

## 2023-03-27 NOTE — DISCUSSION/SUMMARY
[FreeTextEntry1] : EKG today: NSR @ 80 bpm \par - Recommend dietary changes to decrease LDL\par - BP a bit low and may explain her fatigue- will reduce dose of lisinopril and recheck BP in 6-8 weeks\par her LDL is above goal but advised to continue Lipitor at present dose and reduce  eggs and f/u albs

## 2023-03-27 NOTE — REVIEW OF SYSTEMS
[Lower Ext Edema] : lower extremity edema [Fever] : no fever [Chills] : no chills [SOB] : no shortness of breath [Dyspnea on exertion] : not dyspnea during exertion [Chest Discomfort] : no chest discomfort [Leg Claudication] : no intermittent leg claudication [Palpitations] : no palpitations [Orthopnea] : no orthopnea [PND] : no PND [Syncope] : no syncope [Cough] : no cough [Dizziness] : no dizziness

## 2023-03-28 ENCOUNTER — APPOINTMENT (OUTPATIENT)
Dept: ENDOCRINOLOGY | Facility: CLINIC | Age: 83
End: 2023-03-28
Payer: MEDICARE

## 2023-03-28 VITALS
BODY MASS INDEX: 25.61 KG/M2 | HEIGHT: 58 IN | SYSTOLIC BLOOD PRESSURE: 106 MMHG | WEIGHT: 122 LBS | HEART RATE: 79 BPM | DIASTOLIC BLOOD PRESSURE: 65 MMHG

## 2023-03-28 LAB — GLUCOSE BLDC GLUCOMTR-MCNC: 105

## 2023-03-28 PROCEDURE — 96372 THER/PROPH/DIAG INJ SC/IM: CPT

## 2023-03-28 PROCEDURE — 82962 GLUCOSE BLOOD TEST: CPT

## 2023-03-28 PROCEDURE — 99214 OFFICE O/P EST MOD 30 MIN: CPT | Mod: 25

## 2023-03-28 RX ORDER — DENOSUMAB 60 MG/ML
60 INJECTION SUBCUTANEOUS
Qty: 1 | Refills: 0 | Status: COMPLETED | OUTPATIENT
Start: 2023-03-28

## 2023-03-28 RX ADMIN — DENOSUMAB 0 MG/ML: 60 INJECTION SUBCUTANEOUS at 00:00

## 2023-03-28 NOTE — PHYSICAL EXAM
[Alert] : alert [Healthy Appearance] : healthy appearance [No Acute Distress] : no acute distress [Normal Sclera/Conjunctiva] : normal sclera/conjunctiva [Normal Hearing] : hearing was normal [No Respiratory Distress] : no respiratory distress [No Spinal Tenderness] : no spinal tenderness [No Stigmata of Cushings Syndrome] : no stigmata of Cushings Syndrome [Normal Gait] : normal gait [Normal Insight/Judgement] : insight and judgment were intact [Kyphosis] : no kyphosis present [Scoliosis] : no scoliosis [Acanthosis Nigricans] : no acanthosis nigricans [de-identified] : no moon facies, no supraclavicular fat pads

## 2023-03-28 NOTE — DATA REVIEWED
[FreeTextEntry1] : Most recent bone mineral density\par Date: February 21, 2023\par Source: Hologic\par Site: [facility]\par \par Site	BMD	T-score	Change previous	Change baseline	\par Lumbar spine	1.078	+0.5	+8.0%*		\par Femoral neck	0.582	-2.4			\par Total hip                  	0.738	-1.7			\par Distal radius	0.557	-2.3	+6.7%*	\par DXA comments: *Significant change from previous; L4 excluded; left hip values\par Vertebral fracture assessment without evidence of compression fractures T7-L5 (my read). \par \par Impression: I have personally reviewed the DXA images and report, and I compared these images to a DXA dated November 2, 2023 from [facility]. There is osteoporosis by the World Health Organization criteria. There were significant improvements at the lumbar spine and distal radius from previous. Vertebral fracture assessment without evidence of compression fractures.

## 2023-03-28 NOTE — ASSESSMENT
[FreeTextEntry1] : Osteoporosis. She has no history of fragility fracture. She has a history of prior osteoporosis therapy as above, and most recently denosumab from August 2019 to present. Her most recent bone density demonstrated significant improvements at the lumbar spine and distal radius from previous. Metabolic evaluation for secondary causes of bone loss previously unremarkable. We discussed the potential benefits and risks of antiresorptive osteoporosis therapy, including but not limited to osteonecrosis of the jaw and atypical femoral fracture. She is tolerating denosumab and we will plan to continue. We discussed that denosumab must be dosed every 6 months due to rebound increase in bone breakdown with abrupt discontinuation of therapy, with transition to bisphosphonate therapy prior to a "drug holiday."\par Continue denosumab 60 mg SC every 6 months; dose administered today\par Calcium 2336-1748 mg daily from diet and supplements (to be taken in divided doses as no more than 500-600 mg can be absorbed at one time)\par Continue current vitamin D regimen\par Diet, exercise and fall prevention discussed\par \par Elevated hemoglobin A1c. HbA1c 6.2% in September 2022 and glucose 105 mg/dL today. We reviewed lifestyle modifications for glycemic control. We will continue to monitor.\par \par I reviewed the DXA performed on February 21, 2023 with the patient today.\par I reviewed the laboratories performed on September 21, 2022 with the patient today. \par I counseled the patient regarding calcium and vitamin D intake today.\par I discussed the following osteoporosis therapies: Denosumab

## 2023-03-28 NOTE — HISTORY OF PRESENT ILLNESS
[FreeTextEntry1] : Ms. Lockwood is an 82 year-old woman with a history of hiatal hernia presenting for follow-up of osteoporosis and elevated hemoglobin A1c. I saw her for an initial visit in September 2021 and last in September 2022; she is a former patient of Michael Morfin, Mony Ferrer, and Donnie Cano. \par \par Bone History\par Menopause: Age 51\par Osteoporosis diagnosed many years ago (no report available); most recent bone density as below\par Fracture history: None\par Family history: No parental history of hip fracture\par Treatment: \par Hormone replacement therapy from 1992 to 1994\par Raloxifene 60 mg daily from 1995 to 2012\par Ibandronate 3 mg IV from 2010 to March 2012 and March 2014 to July 2016\par Denosumab 60 mg SC in August 2019, February 2020, August 2020, March 2021, September 2021, March 2022, September 2022\par \par Falls: No\par Height loss: About an inch\par Kidney stones: No\par Dental health: Regular appointments, no history of implants, no upcoming procedures planned\par Exercise: Walks, recumbent bicycle a few times a week,  twice a week\par Dairy intake: 1-2 servings daily (glass of milk, cottage cheese a few days a week)\par Calcium supplements: None\par Multivitamin: Centrum Silver Women 50+ (calcium 220 mg, vitamin D 1000 intl units)\par Vitamin D supplements: 2000 intl units three times a week\par \par Osteoporosis risk factors include: Postmenopausal status,  race, prior fracture, falls, height loss, small thin bones, tobacco use, excessive alcohol, anorexia, family history, vitamin D deficiency, corticosteroid use, seizure medications, malabsorption, hyperparathyroidism, hyperthyroidism.\par NEGATIVE EXCEPT: Postmenopausal status,  race\par \par Elevated hemoglobin A1c.\par She has a longstanding history of elevated hemoglobin A1c, up to 6.5% on two occasions. Father and brother with history of type 2 diabetes mellitus. \par She did not tolerate immediate release metformin. \par She has made extensive lifestyle modifications. \par \par Interim History \par She presents today for denosumab injection. \par Recent bone density as below. There were significant improvements at the lumbar spine and distal radius from previous. Vertebral fracture assessment without evidence of compression fractures. \par Laboratory results from last visit as below. Blood urea nitrogen elevated and recommended hydration. HbA1c 6.2% and recommended continued lifestyle modifications.  mg/dL on atorvastatin; recommended follow-up with primary care. TSH, T4/T3 within range with negative antibodies. Other test results within range. \par She has continued lifestyle modifications and cut out sweets. \par She has had foot pain and is changing her sneakers. \par She has seen Dr. Lalo Mccormick; note reviewed. Her dose of lisinopril was adjusted. \par Medical and surgical history, medications, allergies, social and family history reviewed and updated as needed.

## 2023-03-28 NOTE — ADDENDUM
[FreeTextEntry1] : Procedure Note: Denosumab 60 mg SC administered into right deltoid area. Aliya Bhatti MD

## 2023-04-13 ENCOUNTER — APPOINTMENT (OUTPATIENT)
Dept: HEART AND VASCULAR | Facility: CLINIC | Age: 83
End: 2023-04-13
Payer: MEDICARE

## 2023-04-13 VITALS
HEART RATE: 74 BPM | HEIGHT: 58 IN | SYSTOLIC BLOOD PRESSURE: 110 MMHG | BODY MASS INDEX: 26.03 KG/M2 | TEMPERATURE: 97.7 F | WEIGHT: 124 LBS | OXYGEN SATURATION: 96 % | DIASTOLIC BLOOD PRESSURE: 70 MMHG

## 2023-04-13 DIAGNOSIS — I35.1 NONRHEUMATIC AORTIC (VALVE) INSUFFICIENCY: ICD-10-CM

## 2023-04-13 PROCEDURE — 93306 TTE W/DOPPLER COMPLETE: CPT

## 2023-04-17 PROBLEM — I35.1 MILD AORTIC INSUFFICIENCY: Status: ACTIVE | Noted: 2023-04-17

## 2023-06-16 ENCOUNTER — RX RENEWAL (OUTPATIENT)
Age: 83
End: 2023-06-16

## 2023-08-16 ENCOUNTER — RX RENEWAL (OUTPATIENT)
Age: 83
End: 2023-08-16

## 2023-09-11 ENCOUNTER — RX RENEWAL (OUTPATIENT)
Age: 83
End: 2023-09-11

## 2023-09-27 ENCOUNTER — APPOINTMENT (OUTPATIENT)
Dept: ENDOCRINOLOGY | Facility: CLINIC | Age: 83
End: 2023-09-27
Payer: MEDICARE

## 2023-09-27 ENCOUNTER — APPOINTMENT (OUTPATIENT)
Dept: ENDOCRINOLOGY | Facility: CLINIC | Age: 83
End: 2023-09-27

## 2023-09-27 VITALS
HEART RATE: 87 BPM | HEIGHT: 58 IN | WEIGHT: 121 LBS | SYSTOLIC BLOOD PRESSURE: 126 MMHG | DIASTOLIC BLOOD PRESSURE: 70 MMHG | BODY MASS INDEX: 25.4 KG/M2

## 2023-09-27 LAB
GLUCOSE BLDC GLUCOMTR-MCNC: 131
HBA1C MFR BLD HPLC: 6.3

## 2023-09-27 PROCEDURE — 99213 OFFICE O/P EST LOW 20 MIN: CPT | Mod: 25

## 2023-09-27 PROCEDURE — 96372 THER/PROPH/DIAG INJ SC/IM: CPT

## 2023-09-27 PROCEDURE — 83036 HEMOGLOBIN GLYCOSYLATED A1C: CPT | Mod: QW

## 2023-09-27 PROCEDURE — 82962 GLUCOSE BLOOD TEST: CPT

## 2023-09-27 RX ORDER — DENOSUMAB 60 MG/ML
60 INJECTION SUBCUTANEOUS
Qty: 60 | Refills: 0 | Status: COMPLETED | OUTPATIENT
Start: 2023-09-27

## 2023-09-27 RX ADMIN — DENOSUMAB 0 MG/ML: 60 INJECTION SUBCUTANEOUS at 00:00

## 2023-10-23 LAB
25(OH)D3 SERPL-MCNC: 67.6 NG/ML
ALBUMIN SERPL ELPH-MCNC: 4.2 G/DL
ALP BLD-CCNC: 57 U/L
ALT SERPL-CCNC: 9 U/L
ANION GAP SERPL CALC-SCNC: 11 MMOL/L
APPEARANCE: CLEAR
AST SERPL-CCNC: 17 U/L
BACTERIA: NEGATIVE /HPF
BILIRUB SERPL-MCNC: 0.8 MG/DL
BILIRUBIN URINE: NEGATIVE
BLOOD URINE: NEGATIVE
BUN SERPL-MCNC: 26 MG/DL
CALCIUM SERPL-MCNC: 9.4 MG/DL
CAST: 1 /LPF
CHLORIDE SERPL-SCNC: 102 MMOL/L
CHOLEST SERPL-MCNC: 196 MG/DL
CO2 SERPL-SCNC: 25 MMOL/L
COLOR: YELLOW
CREAT SERPL-MCNC: 0.81 MG/DL
EGFR: 73 ML/MIN/1.73M2
EPITHELIAL CELLS: 1 /HPF
ESTIMATED AVERAGE GLUCOSE: 128 MG/DL
GLUCOSE QUALITATIVE U: NEGATIVE MG/DL
GLUCOSE SERPL-MCNC: 107 MG/DL
HBA1C MFR BLD HPLC: 6.1 %
HCT VFR BLD CALC: 39.4 %
HDLC SERPL-MCNC: 83 MG/DL
HGB BLD-MCNC: 12.4 G/DL
KETONES URINE: NEGATIVE MG/DL
LDLC SERPL CALC-MCNC: 99 MG/DL
LEUKOCYTE ESTERASE URINE: ABNORMAL
MCHC RBC-ENTMCNC: 30 PG
MCHC RBC-ENTMCNC: 31.5 GM/DL
MCV RBC AUTO: 95.2 FL
MICROSCOPIC-UA: NORMAL
NITRITE URINE: NEGATIVE
NONHDLC SERPL-MCNC: 113 MG/DL
PH URINE: 5.5
PLATELET # BLD AUTO: 232 K/UL
POTASSIUM SERPL-SCNC: 4.7 MMOL/L
PROT SERPL-MCNC: 6.7 G/DL
PROTEIN URINE: NEGATIVE MG/DL
RBC # BLD: 4.14 M/UL
RBC # FLD: 13.7 %
RED BLOOD CELLS URINE: 2 /HPF
SODIUM SERPL-SCNC: 138 MMOL/L
SPECIFIC GRAVITY URINE: 1.02
T4 FREE SERPL-MCNC: 1.6 NG/DL
TRIGL SERPL-MCNC: 81 MG/DL
TSH SERPL-ACNC: 2.74 UIU/ML
UROBILINOGEN URINE: 0.2 MG/DL
WBC # FLD AUTO: 7.52 K/UL
WHITE BLOOD CELLS URINE: 0 /HPF

## 2023-10-25 ENCOUNTER — APPOINTMENT (OUTPATIENT)
Dept: INTERNAL MEDICINE | Facility: CLINIC | Age: 83
End: 2023-10-25
Payer: MEDICARE

## 2023-10-25 VITALS
TEMPERATURE: 98.2 F | BODY MASS INDEX: 25.45 KG/M2 | OXYGEN SATURATION: 95 % | WEIGHT: 121.25 LBS | SYSTOLIC BLOOD PRESSURE: 90 MMHG | DIASTOLIC BLOOD PRESSURE: 60 MMHG | HEART RATE: 91 BPM | HEIGHT: 58 IN

## 2023-10-25 PROCEDURE — G0439: CPT

## 2023-11-08 ENCOUNTER — RX RENEWAL (OUTPATIENT)
Age: 83
End: 2023-11-08

## 2023-12-12 ENCOUNTER — RX RENEWAL (OUTPATIENT)
Age: 83
End: 2023-12-12

## 2024-01-29 ENCOUNTER — RX RENEWAL (OUTPATIENT)
Age: 84
End: 2024-01-29

## 2024-01-29 RX ORDER — LISINOPRIL 2.5 MG/1
2.5 TABLET ORAL
Qty: 90 | Refills: 0 | Status: ACTIVE | COMMUNITY
Start: 2023-11-08 | End: 1900-01-01

## 2024-01-29 RX ORDER — ESOMEPRAZOLE MAGNESIUM 40 MG/1
40 CAPSULE, DELAYED RELEASE ORAL TWICE DAILY
Qty: 180 | Refills: 0 | Status: ACTIVE | COMMUNITY
Start: 2019-03-20 | End: 1900-01-01

## 2024-03-05 ENCOUNTER — NON-APPOINTMENT (OUTPATIENT)
Age: 84
End: 2024-03-05

## 2024-03-08 ENCOUNTER — RX RENEWAL (OUTPATIENT)
Age: 84
End: 2024-03-08

## 2024-03-26 ENCOUNTER — APPOINTMENT (OUTPATIENT)
Dept: ENDOCRINOLOGY | Facility: CLINIC | Age: 84
End: 2024-03-26
Payer: MEDICARE

## 2024-03-26 VITALS
SYSTOLIC BLOOD PRESSURE: 114 MMHG | HEART RATE: 80 BPM | BODY MASS INDEX: 25.08 KG/M2 | DIASTOLIC BLOOD PRESSURE: 70 MMHG | WEIGHT: 120 LBS

## 2024-03-26 DIAGNOSIS — M81.0 AGE-RELATED OSTEOPOROSIS W/OUT CURRENT PATHOLOGICAL FRACTURE: ICD-10-CM

## 2024-03-26 DIAGNOSIS — R73.09 OTHER ABNORMAL GLUCOSE: ICD-10-CM

## 2024-03-26 LAB
GLUCOSE BLDC GLUCOMTR-MCNC: 110
HBA1C MFR BLD HPLC: 6.3

## 2024-03-26 PROCEDURE — 99214 OFFICE O/P EST MOD 30 MIN: CPT | Mod: 25

## 2024-03-26 PROCEDURE — 83036 HEMOGLOBIN GLYCOSYLATED A1C: CPT | Mod: QW

## 2024-03-26 PROCEDURE — 96372 THER/PROPH/DIAG INJ SC/IM: CPT

## 2024-03-26 RX ORDER — DENOSUMAB 60 MG/ML
60 INJECTION SUBCUTANEOUS
Qty: 1 | Refills: 0 | Status: COMPLETED | OUTPATIENT
Start: 2024-03-26

## 2024-03-26 RX ADMIN — DENOSUMAB 60 MG/ML: 60 INJECTION SUBCUTANEOUS at 00:00

## 2024-03-26 NOTE — HISTORY OF PRESENT ILLNESS
[FreeTextEntry1] : Ms. Lockwood is an 83 year-old woman with a history of hiatal hernia presenting for follow-up of osteoporosis and elevated hemoglobin A1c. I saw her for an initial visit in September 2021 and last in March 2023; she saw Lisa Hoyt NP in September 2023.  Bone History Menopause: Age 51 Osteoporosis diagnosed many years ago (no report available); most recent bone density as below Fracture history: None Family history: No parental history of hip fracture Treatment:  Hormone replacement therapy from 1992 to 1994 Raloxifene 60 mg daily from 1995 to 2012 Ibandronate 3 mg IV from 2010 to March 2012 and March 2014 to July 2016 Denosumab 60 mg SC in August 2019, February 2020, August 2020, March 2021, September 2021, March 2022, September 2022, March 2023, September 2023  Falls: No Height loss: About an inch Kidney stones: No Dental health: Regular appointments, no history of implants, no upcoming procedures planned Exercise: Walks, recumbent bicycle a few times a week,  twice a week Dairy intake: 1-2 servings daily (glass of milk or yogurt daily; occasional cheese) Calcium supplements: None Multivitamin: Centrum Silver Women 50+ (calcium 220 mg, vitamin D 1000 intl units) Vitamin D supplements: 2000 intl units three times a week  Osteoporosis risk factors include: Postmenopausal status,  race, prior fracture, falls, height loss, small thin bones, tobacco use, excessive alcohol, anorexia, family history, vitamin D deficiency, corticosteroid use, seizure medications, malabsorption, hyperparathyroidism, hyperthyroidism. NEGATIVE EXCEPT: Postmenopausal status,  race  Elevated hemoglobin A1c. She has a longstanding history of elevated hemoglobin A1c, up to 6.5% on two occasions. Father and brother with history of type 2 diabetes mellitus.  She did not tolerate immediate release metformin.  She has made extensive lifestyle modifications.   Interim History  She has received denosumab from August 2019 to present. Recent bone density as below. There is osteopenia by the World Health Organization criteria. There was a significant improvement at the lumbar spine from previous. Vertebral fracture assessment without evidence of compression fractures. She has continued lifestyle modifications and decreased sweets. She has recently increased intake of dark chocolate. She has seen Dr. Ely Baca; note reviewed. Last laboratory results reviewed. Serum calcium, renal function, TSH, and 25-hydroxyvitamin D within range. HbA1c 6.1%.  She feels well today. No acute issues. She and her  will be [place] [facility] to visit her daughter, who will be renting a house there. Medical and surgical history, medications, allergies, social and family history reviewed and updated as needed.

## 2024-03-26 NOTE — ADDENDUM
[FreeTextEntry1] : Procedure Note: Denosumab 60 mg SC administered into right upper arm. Aliya Bhatti MD

## 2024-03-26 NOTE — ASSESSMENT
[FreeTextEntry1] : Osteoporosis. She has no history of fragility fracture. She has a history of prior osteoporosis therapy as above, and most recently denosumab from August 2019 to present. Her most recent bone density demonstrated a significant improvement at the lumbar spine from previous. Metabolic evaluation for secondary causes of bone loss previously unremarkable. We discussed the potential benefits and risks of antiresorptive osteoporosis therapy, including but not limited to osteonecrosis of the jaw and atypical femoral fracture. She is tolerating denosumab and we will plan to continue for at least one additional year. We discussed that denosumab must be dosed every 6 months due to rebound increase in bone breakdown with abrupt discontinuation of therapy, with transition to bisphosphonate therapy prior to a "drug holiday." We will consider transition to zoledronic acid therapy in March 2025. Continue denosumab 60 mg SC every 6 months; dose administered today Calcium 1714-9956 mg daily from diet and supplements (to be taken in divided doses as no more than 500-600 mg can be absorbed at one time); advised increased dietary and/or supplemental calcium Continue current vitamin D regimen Diet, exercise and fall prevention discussed  Elevated hemoglobin A1c. Point-of-care HbA1c 6.3% and glucose 110 mg/dL today. We reviewed lifestyle modifications for glycemic control. We will continue to monitor.  I reviewed the DXA performed on March 6, 2024 with the patient today. I reviewed the laboratories performed on October 18, 2023 with the patient today.  I counseled the patient regarding calcium and vitamin D intake today. I discussed the following osteoporosis therapies: Denosumab

## 2024-03-26 NOTE — PHYSICAL EXAM
[Alert] : alert [Healthy Appearance] : healthy appearance [No Acute Distress] : no acute distress [Normal Sclera/Conjunctiva] : normal sclera/conjunctiva [Normal Hearing] : hearing was normal [No Spinal Tenderness] : no spinal tenderness [No Respiratory Distress] : no respiratory distress [No Stigmata of Cushings Syndrome] : no stigmata of Cushings Syndrome [Normal Gait] : normal gait [Normal Insight/Judgement] : insight and judgment were intact [Kyphosis] : no kyphosis present [Scoliosis] : no scoliosis [Acanthosis Nigricans] : no acanthosis nigricans [de-identified] : no moon facies, no supraclavicular fat pads

## 2024-06-24 RX ORDER — NIRMATRELVIR AND RITONAVIR 300-100 MG
20 X 150 MG & KIT ORAL
Qty: 1 | Refills: 0 | Status: ACTIVE | COMMUNITY
Start: 2024-06-24 | End: 1900-01-01

## 2024-06-27 ENCOUNTER — RX RENEWAL (OUTPATIENT)
Age: 84
End: 2024-06-27

## 2024-08-05 ENCOUNTER — RX RENEWAL (OUTPATIENT)
Age: 84
End: 2024-08-05

## 2024-09-24 ENCOUNTER — NON-APPOINTMENT (OUTPATIENT)
Age: 84
End: 2024-09-24

## 2024-09-25 ENCOUNTER — APPOINTMENT (OUTPATIENT)
Dept: ENDOCRINOLOGY | Facility: CLINIC | Age: 84
End: 2024-09-25
Payer: MEDICARE

## 2024-09-25 VITALS
HEART RATE: 76 BPM | WEIGHT: 120 LBS | BODY MASS INDEX: 25.08 KG/M2 | SYSTOLIC BLOOD PRESSURE: 105 MMHG | DIASTOLIC BLOOD PRESSURE: 59 MMHG

## 2024-09-25 DIAGNOSIS — M81.0 AGE-RELATED OSTEOPOROSIS W/OUT CURRENT PATHOLOGICAL FRACTURE: ICD-10-CM

## 2024-09-25 DIAGNOSIS — R73.09 OTHER ABNORMAL GLUCOSE: ICD-10-CM

## 2024-09-25 LAB
GLUCOSE BLDC GLUCOMTR-MCNC: 105
HBA1C MFR BLD HPLC: 6.2

## 2024-09-25 PROCEDURE — 96372 THER/PROPH/DIAG INJ SC/IM: CPT

## 2024-09-25 PROCEDURE — 82962 GLUCOSE BLOOD TEST: CPT

## 2024-09-25 PROCEDURE — G2211 COMPLEX E/M VISIT ADD ON: CPT

## 2024-09-25 PROCEDURE — 99214 OFFICE O/P EST MOD 30 MIN: CPT | Mod: 25

## 2024-09-25 PROCEDURE — 83036 HEMOGLOBIN GLYCOSYLATED A1C: CPT | Mod: QW

## 2024-09-25 RX ORDER — DENOSUMAB 60 MG/ML
60 INJECTION SUBCUTANEOUS
Qty: 1 | Refills: 0 | Status: COMPLETED | OUTPATIENT
Start: 2024-09-25

## 2024-09-25 RX ADMIN — DENOSUMAB 60 MG/ML: 60 INJECTION SUBCUTANEOUS at 00:00

## 2024-09-25 NOTE — PHYSICAL EXAM
[Alert] : alert [Healthy Appearance] : healthy appearance [No Acute Distress] : no acute distress [Normal Sclera/Conjunctiva] : normal sclera/conjunctiva [Normal Hearing] : hearing was normal [No Respiratory Distress] : no respiratory distress [No Spinal Tenderness] : no spinal tenderness [No Stigmata of Cushings Syndrome] : no stigmata of Cushings Syndrome [Normal Gait] : normal gait [Normal Insight/Judgement] : insight and judgment were intact [Kyphosis] : no kyphosis present [Scoliosis] : no scoliosis [Acanthosis Nigricans] : no acanthosis nigricans [de-identified] : no moon facies, no supraclavicular fat pads

## 2024-09-25 NOTE — HISTORY OF PRESENT ILLNESS
[FreeTextEntry1] : Ms. Lockwood is an 84 year-old woman with a history of hiatal hernia presenting for follow-up of osteoporosis and elevated hemoglobin A1c. I saw her for an initial visit in September 2021 and last in March 2024.  Bone History Menopause: Age 51 Osteoporosis diagnosed many years ago (no report available); most recent bone density as below Fracture history: None Family history: No parental history of hip fracture Treatment:  Hormone replacement therapy from 1992 to 1994 Raloxifene 60 mg daily from 1995 to 2012 Ibandronate 3 mg IV from 2010 to March 2012 and March 2014 to July 2016 Denosumab 60 mg SC in August 2019, February 2020, August 2020, March 2021, September 2021, March 2022, September 2022, March 2023, September 2023, March 2024  Falls: No Height loss: About an inch Kidney stones: No Dental health: Regular appointments, no history of implants, no upcoming procedures planned Exercise: Walks, recumbent bicycle a few times a week,  twice a week Dairy intake: 1-2 servings daily (glass of milk or yogurt daily; occasional cheese) Calcium supplements: None Multivitamin: Centrum Silver Women 50+ (calcium 300 mg, vitamin D 1000 intl units) Vitamin D supplements: 2000 intl units three times a week  Osteoporosis risk factors include: Postmenopausal status,  race, prior fracture, falls, height loss, small thin bones, tobacco use, excessive alcohol, anorexia, family history, vitamin D deficiency, corticosteroid use, seizure medications, malabsorption, hyperparathyroidism, hyperthyroidism. NEGATIVE EXCEPT: Postmenopausal status,  race  Elevated hemoglobin A1c. She has a longstanding history of elevated hemoglobin A1c, up to 6.5% on two occasions. Father and brother with history of type 2 diabetes mellitus.  She did not tolerate immediate release metformin.  She has made extensive lifestyle modifications.   Interim History  She has received denosumab from August 2019 to present. She has had dietary indiscretions over the summer. She feels well today. No acute issues. She and her  [place] [facility] [facility] to visit her daughter. Medical and surgical history, medications, allergies, social and family history reviewed and updated as needed.

## 2024-09-25 NOTE — DATA REVIEWED
[FreeTextEntry1] : Most recent bone mineral density Date: March 6, 2024 Source: Hologic Site: [facility]  Site BMD T-score Change previous Change baseline  Lumbar spine 1.143 +0.9  +3.7%* Femoral neck 0.602 -2.2    Total hip 0.747 -1.6  +1.3%  Distal radius 0.561 -2.2    DXA comments: *Significant change from previous; left hip values Vertebral fracture assessment without evidence of compression fractures T8 to L5 (my read)  Impression: I have personally reviewed the DXA images and report. There is osteopenia by the World Health Organization criteria. There was a significant improvement at the lumbar spine from previous. Vertebral fracture assessment without evidence of compression fractures.

## 2024-09-25 NOTE — ASSESSMENT
[FreeTextEntry1] : Osteoporosis. She has no history of fragility fracture. She has a history of prior osteoporosis therapy as above, and most recently denosumab from 2019 to present. Her last bone density demonstrated a significant improvement at the lumbar spine from previous. Metabolic evaluation for secondary causes of bone loss previously unremarkable. We have discussed the potential benefits and risks of antiresorptive osteoporosis therapy, including but not limited to osteonecrosis of the jaw and atypical femoral fracture. We discussed that denosumab must be dosed every 6 months due to rebound increase in bone breakdown with abrupt discontinuation of therapy, with transition to bisphosphonate therapy prior to a "drug holiday." We will consider transition to zoledronic acid therapy in 2025. Continue denosumab 60 mg SC every 6 months; dose administered today Calcium 9570-1799 mg daily from diet and supplements (to be taken in divided doses as no more than 500-600 mg can be absorbed at one time); again advised increased dietary and/or supplemental calcium Continue current vitamin D regimen Diet, exercise and fall prevention discussed  Elevated hemoglobin A1c. Point-of-care HbA1c 6.2% and glucose 105 mg/dL today. We reviewed lifestyle modifications for glycemic control. We will continue to monitor.  Next bone density testin months Next appointment: 6 months or earlier as needed  I reviewed the DXA performed on 2024 with the patient today. I reviewed the laboratories performed on 2023 with the patient today.  I counseled the patient regarding calcium and vitamin D intake today. I discussed the following osteoporosis therapies: Denosumab

## 2024-10-07 ENCOUNTER — RX RENEWAL (OUTPATIENT)
Age: 84
End: 2024-10-07

## 2024-10-29 ENCOUNTER — RX RENEWAL (OUTPATIENT)
Age: 84
End: 2024-10-29

## 2024-12-16 ENCOUNTER — APPOINTMENT (OUTPATIENT)
Dept: INTERNAL MEDICINE | Facility: CLINIC | Age: 84
End: 2024-12-16
Payer: MEDICARE

## 2024-12-16 VITALS
DIASTOLIC BLOOD PRESSURE: 66 MMHG | HEART RATE: 77 BPM | WEIGHT: 120 LBS | OXYGEN SATURATION: 94 % | TEMPERATURE: 97.9 F | SYSTOLIC BLOOD PRESSURE: 106 MMHG | HEIGHT: 59 IN | BODY MASS INDEX: 24.19 KG/M2

## 2024-12-16 DIAGNOSIS — R73.09 OTHER ABNORMAL GLUCOSE: ICD-10-CM

## 2024-12-16 DIAGNOSIS — Z00.00 ENCOUNTER FOR GENERAL ADULT MEDICAL EXAMINATION W/OUT ABNORMAL FINDINGS: ICD-10-CM

## 2024-12-16 DIAGNOSIS — E78.2 MIXED HYPERLIPIDEMIA: ICD-10-CM

## 2024-12-16 DIAGNOSIS — I10 ESSENTIAL (PRIMARY) HYPERTENSION: ICD-10-CM

## 2024-12-16 LAB
25(OH)D3 SERPL-MCNC: 77.5 NG/ML
ALBUMIN SERPL ELPH-MCNC: 4.3 G/DL
ALP BLD-CCNC: 112 U/L
ALT SERPL-CCNC: 49 U/L
ANION GAP SERPL CALC-SCNC: 14 MMOL/L
APPEARANCE: CLEAR
AST SERPL-CCNC: 50 U/L
BACTERIA: NEGATIVE /HPF
BILIRUB SERPL-MCNC: 0.7 MG/DL
BILIRUBIN URINE: NEGATIVE
BLOOD URINE: NEGATIVE
BUN SERPL-MCNC: 24 MG/DL
CALCIUM SERPL-MCNC: 10.6 MG/DL
CAST: 0 /LPF
CHLORIDE SERPL-SCNC: 99 MMOL/L
CHOLEST SERPL-MCNC: 148 MG/DL
CO2 SERPL-SCNC: 26 MMOL/L
COLOR: YELLOW
CREAT SERPL-MCNC: 0.88 MG/DL
EGFR: 66 ML/MIN/1.73M2
EPITHELIAL CELLS: 0 /HPF
ESTIMATED AVERAGE GLUCOSE: 126 MG/DL
GLUCOSE QUALITATIVE U: NEGATIVE MG/DL
GLUCOSE SERPL-MCNC: 109 MG/DL
HBA1C MFR BLD HPLC: 6 %
HCT VFR BLD CALC: 41.6 %
HDLC SERPL-MCNC: 60 MG/DL
HGB BLD-MCNC: 13.5 G/DL
KETONES URINE: NEGATIVE MG/DL
LDLC SERPL CALC-MCNC: 71 MG/DL
LEUKOCYTE ESTERASE URINE: NEGATIVE
MCHC RBC-ENTMCNC: 30.8 PG
MCHC RBC-ENTMCNC: 32.5 G/DL
MCV RBC AUTO: 94.8 FL
MICROSCOPIC-UA: NORMAL
NITRITE URINE: NEGATIVE
NONHDLC SERPL-MCNC: 87 MG/DL
PH URINE: 6
PLATELET # BLD AUTO: 266 K/UL
POTASSIUM SERPL-SCNC: 4.7 MMOL/L
PROT SERPL-MCNC: 6.9 G/DL
PROTEIN URINE: NEGATIVE MG/DL
RBC # BLD: 4.39 M/UL
RBC # FLD: 13.4 %
RED BLOOD CELLS URINE: 0 /HPF
SODIUM SERPL-SCNC: 139 MMOL/L
SPECIFIC GRAVITY URINE: 1.01
T4 FREE SERPL-MCNC: 1.7 NG/DL
TRIGL SERPL-MCNC: 87 MG/DL
TSH SERPL-ACNC: 6.04 UIU/ML
UROBILINOGEN URINE: 0.2 MG/DL
WBC # FLD AUTO: 7.33 K/UL
WHITE BLOOD CELLS URINE: 0 /HPF

## 2024-12-16 PROCEDURE — G0439: CPT

## 2025-01-06 ENCOUNTER — RX RENEWAL (OUTPATIENT)
Age: 85
End: 2025-01-06

## 2025-01-10 ENCOUNTER — APPOINTMENT (OUTPATIENT)
Dept: ENDOCRINOLOGY | Facility: CLINIC | Age: 85
End: 2025-01-10

## 2025-02-05 ENCOUNTER — RX RENEWAL (OUTPATIENT)
Age: 85
End: 2025-02-05

## 2025-03-06 ENCOUNTER — APPOINTMENT (OUTPATIENT)
Dept: HEART AND VASCULAR | Facility: CLINIC | Age: 85
End: 2025-03-06
Payer: MEDICARE

## 2025-03-06 ENCOUNTER — NON-APPOINTMENT (OUTPATIENT)
Age: 85
End: 2025-03-06

## 2025-03-06 VITALS
HEIGHT: 59 IN | WEIGHT: 120 LBS | DIASTOLIC BLOOD PRESSURE: 72 MMHG | OXYGEN SATURATION: 97 % | TEMPERATURE: 97.8 F | BODY MASS INDEX: 24.19 KG/M2 | HEART RATE: 67 BPM | SYSTOLIC BLOOD PRESSURE: 110 MMHG

## 2025-03-06 DIAGNOSIS — I34.0 NONRHEUMATIC MITRAL (VALVE) INSUFFICIENCY: ICD-10-CM

## 2025-03-06 DIAGNOSIS — I10 ESSENTIAL (PRIMARY) HYPERTENSION: ICD-10-CM

## 2025-03-06 DIAGNOSIS — I35.1 NONRHEUMATIC AORTIC (VALVE) INSUFFICIENCY: ICD-10-CM

## 2025-03-06 DIAGNOSIS — E78.2 MIXED HYPERLIPIDEMIA: ICD-10-CM

## 2025-03-06 PROCEDURE — 93306 TTE W/DOPPLER COMPLETE: CPT

## 2025-03-06 PROCEDURE — 93000 ELECTROCARDIOGRAM COMPLETE: CPT

## 2025-03-06 PROCEDURE — 99214 OFFICE O/P EST MOD 30 MIN: CPT | Mod: 25

## 2025-03-11 ENCOUNTER — APPOINTMENT (OUTPATIENT)
Dept: RADIOLOGY | Facility: CLINIC | Age: 85
End: 2025-03-11
Payer: MEDICARE

## 2025-03-11 PROCEDURE — 77085 DXA BONE DENSITY AXL VRT FX: CPT

## 2025-03-12 ENCOUNTER — NON-APPOINTMENT (OUTPATIENT)
Age: 85
End: 2025-03-12

## 2025-03-12 DIAGNOSIS — R94.6 ABNORMAL RESULTS OF THYROID FUNCTION STUDIES: ICD-10-CM

## 2025-03-19 LAB
T4 FREE SERPL-MCNC: 1.6 NG/DL
TSH SERPL-ACNC: 5.05 UIU/ML

## 2025-03-26 ENCOUNTER — APPOINTMENT (OUTPATIENT)
Dept: ENDOCRINOLOGY | Facility: CLINIC | Age: 85
End: 2025-03-26
Payer: MEDICARE

## 2025-03-26 VITALS
SYSTOLIC BLOOD PRESSURE: 99 MMHG | WEIGHT: 120 LBS | BODY MASS INDEX: 24.24 KG/M2 | DIASTOLIC BLOOD PRESSURE: 65 MMHG | HEART RATE: 76 BPM

## 2025-03-26 DIAGNOSIS — E03.8 OTHER SPECIFIED HYPOTHYROIDISM: ICD-10-CM

## 2025-03-26 DIAGNOSIS — M81.0 AGE-RELATED OSTEOPOROSIS W/OUT CURRENT PATHOLOGICAL FRACTURE: ICD-10-CM

## 2025-03-26 DIAGNOSIS — R73.09 OTHER ABNORMAL GLUCOSE: ICD-10-CM

## 2025-03-26 LAB
GLUCOSE BLDC GLUCOMTR-MCNC: 105
HBA1C MFR BLD HPLC: 5.9

## 2025-03-26 PROCEDURE — 99214 OFFICE O/P EST MOD 30 MIN: CPT

## 2025-03-26 PROCEDURE — 83036 HEMOGLOBIN GLYCOSYLATED A1C: CPT | Mod: QW

## 2025-03-26 PROCEDURE — 82962 GLUCOSE BLOOD TEST: CPT

## 2025-03-26 PROCEDURE — G2211 COMPLEX E/M VISIT ADD ON: CPT

## 2025-03-27 ENCOUNTER — OUTPATIENT (OUTPATIENT)
Dept: OUTPATIENT SERVICES | Facility: HOSPITAL | Age: 85
LOS: 1 days | End: 2025-03-27
Payer: MEDICARE

## 2025-03-27 ENCOUNTER — APPOINTMENT (OUTPATIENT)
Dept: INFUSION THERAPY | Facility: CLINIC | Age: 85
End: 2025-03-27

## 2025-03-27 VITALS
SYSTOLIC BLOOD PRESSURE: 110 MMHG | HEIGHT: 56 IN | TEMPERATURE: 97 F | OXYGEN SATURATION: 96 % | HEART RATE: 78 BPM | RESPIRATION RATE: 18 BRPM | WEIGHT: 121.92 LBS | DIASTOLIC BLOOD PRESSURE: 53 MMHG

## 2025-03-27 DIAGNOSIS — M81.0 AGE-RELATED OSTEOPOROSIS WITHOUT CURRENT PATHOLOGICAL FRACTURE: ICD-10-CM

## 2025-03-27 PROCEDURE — 96372 THER/PROPH/DIAG INJ SC/IM: CPT

## 2025-03-27 RX ORDER — DENOSUMAB 60 MG/ML
60 INJECTION SUBCUTANEOUS ONCE
Refills: 0 | Status: COMPLETED | OUTPATIENT
Start: 2025-03-27 | End: 2025-03-27

## 2025-03-27 RX ORDER — VITAMIN B COMPLEX/MINERALS
TABLET ORAL
Refills: 0 | Status: ACTIVE | COMMUNITY

## 2025-03-27 RX ADMIN — DENOSUMAB 60 MILLIGRAM(S): 60 INJECTION SUBCUTANEOUS at 16:50

## 2025-04-04 ENCOUNTER — RX RENEWAL (OUTPATIENT)
Age: 85
End: 2025-04-04

## 2025-05-08 ENCOUNTER — RX RENEWAL (OUTPATIENT)
Age: 85
End: 2025-05-08

## 2025-05-22 DIAGNOSIS — K57.90 DIVERTICULOSIS OF INTESTINE, PART UNSPECIFIED, W/OUT PERFORATION OR ABSCESS W/OUT BLEEDING: ICD-10-CM

## 2025-05-22 DIAGNOSIS — T75.3XXA MOTION SICKNESS, INITIAL ENCOUNTER: ICD-10-CM

## 2025-05-22 RX ORDER — METRONIDAZOLE 500 MG/1
500 TABLET ORAL
Qty: 20 | Refills: 5 | Status: ACTIVE | COMMUNITY
Start: 2025-05-22 | End: 1900-01-01

## 2025-05-22 RX ORDER — SCOPOLAMINE 1 MG/3D
1 PATCH, EXTENDED RELEASE TRANSDERMAL
Qty: 1 | Refills: 0 | Status: ACTIVE | COMMUNITY
Start: 2025-05-22 | End: 1900-01-01

## 2025-05-22 RX ORDER — CIPROFLOXACIN HYDROCHLORIDE 500 MG/1
500 TABLET, FILM COATED ORAL
Qty: 20 | Refills: 1 | Status: ACTIVE | COMMUNITY
Start: 2025-05-22 | End: 1900-01-01

## 2025-06-08 ENCOUNTER — NON-APPOINTMENT (OUTPATIENT)
Age: 85
End: 2025-06-08

## 2025-06-12 LAB
T4 FREE SERPL-MCNC: 1.4 NG/DL
TSH SERPL-ACNC: 3.07 UIU/ML

## 2025-06-19 ENCOUNTER — RX RENEWAL (OUTPATIENT)
Age: 85
End: 2025-06-19

## 2025-08-05 ENCOUNTER — RX RENEWAL (OUTPATIENT)
Age: 85
End: 2025-08-05

## 2025-08-16 ENCOUNTER — NON-APPOINTMENT (OUTPATIENT)
Age: 85
End: 2025-08-16

## 2025-08-18 ENCOUNTER — NON-APPOINTMENT (OUTPATIENT)
Age: 85
End: 2025-08-18

## 2025-08-18 LAB
ALBUMIN SERPL ELPH-MCNC: 4.1 G/DL
ALP BLD-CCNC: 85 U/L
ALT SERPL-CCNC: 16 U/L
ANION GAP SERPL CALC-SCNC: 14 MMOL/L
AST SERPL-CCNC: 20 U/L
BILIRUB SERPL-MCNC: 0.6 MG/DL
BUN SERPL-MCNC: 29 MG/DL
CALCIUM SERPL-MCNC: 9.6 MG/DL
CHLORIDE SERPL-SCNC: 101 MMOL/L
CO2 SERPL-SCNC: 22 MMOL/L
CREAT SERPL-MCNC: 0.84 MG/DL
EGFRCR SERPLBLD CKD-EPI 2021: 69 ML/MIN/1.73M2
GLUCOSE SERPL-MCNC: 104 MG/DL
POTASSIUM SERPL-SCNC: 4.5 MMOL/L
PROT SERPL-MCNC: 6.5 G/DL
SODIUM SERPL-SCNC: 138 MMOL/L